# Patient Record
Sex: MALE | Race: WHITE | Employment: OTHER | URBAN - METROPOLITAN AREA
[De-identification: names, ages, dates, MRNs, and addresses within clinical notes are randomized per-mention and may not be internally consistent; named-entity substitution may affect disease eponyms.]

---

## 2017-05-04 ENCOUNTER — OFFICE VISIT (OUTPATIENT)
Dept: UROLOGY | Age: 82
End: 2017-05-04

## 2017-05-04 VITALS
HEIGHT: 68 IN | WEIGHT: 169 LBS | OXYGEN SATURATION: 98 % | DIASTOLIC BLOOD PRESSURE: 64 MMHG | SYSTOLIC BLOOD PRESSURE: 141 MMHG | TEMPERATURE: 97 F | BODY MASS INDEX: 25.61 KG/M2 | HEART RATE: 63 BPM

## 2017-05-04 DIAGNOSIS — R32 URINARY INCONTINENCE, UNSPECIFIED TYPE: ICD-10-CM

## 2017-05-04 DIAGNOSIS — R33.8 BPH (BENIGN PROSTATIC HYPERTROPHY) WITH URINARY RETENTION: Primary | ICD-10-CM

## 2017-05-04 DIAGNOSIS — N40.1 BPH (BENIGN PROSTATIC HYPERTROPHY) WITH URINARY RETENTION: Primary | ICD-10-CM

## 2017-05-04 DIAGNOSIS — R35.0 FREQUENCY OF URINATION: ICD-10-CM

## 2017-05-04 LAB
BILIRUB UR QL STRIP: NEGATIVE
GLUCOSE UR-MCNC: NEGATIVE MG/DL
KETONES P FAST UR STRIP-MCNC: NEGATIVE MG/DL
PH UR STRIP: 6 [PH] (ref 4.6–8)
PROT UR QL STRIP: NEGATIVE MG/DL
SP GR UR STRIP: 1.01 (ref 1–1.03)
UA UROBILINOGEN AMB POC: NORMAL (ref 0.2–1)
URINALYSIS CLARITY POC: CLEAR
URINALYSIS COLOR POC: YELLOW
URINE BLOOD POC: NEGATIVE
URINE LEUKOCYTES POC: NEGATIVE
URINE NITRITES POC: NEGATIVE

## 2017-05-04 RX ORDER — TAMSULOSIN HYDROCHLORIDE 0.4 MG/1
0.4 CAPSULE ORAL DAILY
COMMUNITY
End: 2017-06-19 | Stop reason: SDUPTHER

## 2017-05-04 RX ORDER — FLUTICASONE PROPIONATE 50 MCG
1 SPRAY, SUSPENSION (ML) NASAL DAILY
COMMUNITY
Start: 2015-04-30 | End: 2018-02-19 | Stop reason: ALTCHOICE

## 2017-05-04 RX ORDER — CHOLECALCIFEROL (VITAMIN D3) 125 MCG
CAPSULE ORAL
COMMUNITY

## 2017-05-04 RX ORDER — OXYBUTYNIN CHLORIDE 5 MG/1
5 TABLET ORAL DAILY
COMMUNITY
End: 2017-08-11 | Stop reason: ALTCHOICE

## 2017-05-04 RX ORDER — SIMVASTATIN 20 MG/1
TABLET, FILM COATED ORAL
COMMUNITY
End: 2021-01-01

## 2017-05-04 NOTE — PATIENT INSTRUCTIONS
Frequent Urination: Care Instructions  Your Care Instructions  An urge to urinate frequently but usually passing only small amounts of urine is a common symptom of urinary problems, such as urinary tract infections. The bladder may become inflamed. This can cause the urge to urinate. You may try to urinate more often than usual to try to soothe that urge. Frequent urination also may be caused by sexually transmitted infections (STIs) or kidney stones. Or it may happen when something irritates the tube that carries urine from the bladder to the outside of the body (urethra). It may also be a sign of diabetes. The cause may be hard to find. You may need tests. Follow-up care is a key part of your treatment and safety. Be sure to make and go to all appointments, and call your doctor if you are having problems. It's also a good idea to know your test results and keep a list of the medicines you take. How can you care for yourself at home? · Drink extra water for the next day or two. This will help make the urine less concentrated. (If you have kidney, heart, or liver disease and have to limit fluids, talk with your doctor before you increase the amount of fluids you drink.)  · Avoid drinks that are carbonated or have caffeine. They can irritate the bladder. For women:  · Urinate right after you have sex. · After you go to the bathroom, wipe from front to back. · Avoid douches, bubble baths, and feminine hygiene sprays. And avoid other feminine hygiene products that have deodorants. When should you call for help? Call your doctor now or seek immediate medical care if:  · You have new symptoms, such as fever, nausea, or vomiting. · You have new or worse symptoms of a urinary problem. For example:  ¨ You have blood or pus in your urine. ¨ You have chills or body aches. ¨ It hurts to urinate. ¨ You have groin or belly pain. ¨ You have pain in your back just below your rib cage (the flank area).   Watch closely for changes in your health, and be sure to contact your doctor if you feel thirstier than usual.  Where can you learn more? Go to http://tu-angela.info/. Enter 687 1908 in the search box to learn more about \"Frequent Urination: Care Instructions. \"  Current as of: November 28, 2016  Content Version: 11.2  © 9038-4142 [employer]. Care instructions adapted under license by [employer] (which disclaims liability or warranty for this information). If you have questions about a medical condition or this instruction, always ask your healthcare professional. [place] [address] any warranty or liability for your use of this information.

## 2017-05-04 NOTE — PROGRESS NOTES
Polo Styles 80 y.o. male     Mr. Abdirahman Thayer seen today for evaluation of irritative voiding symptoms-patient complains of nocturia 5-6 times per night associated with severe and sudden urgency episodes during the daytime during the past several months-patient has been treated with alpha-blocker and anticholinergic medication for relief of irritable bladder symptoms caused by BPH under the care of a urologist in [place] but managed by primary physician in [place] for the past 5 years with Ditropan and Flomax    Urinary stream is weak and intermittent-no dysuria-no fever flank pain or pain in the perineum-  No history of  tract disease, trauma, or surgery    Review of Systems:   CNS: No seizures syncope headaches dizziness or visual changes  Respiratory: No wheezing no shortness of breath no coughing-  Cardiovascular: No chest pains no palpitations  Intestinal: No dyspepsia diarrhea or constipation  Urinary: Irritable bladder symptoms for several years  Skeletal: Large joint arthritis  Endocrine: No diabetes or thyroid disease other:    Allergies: Allergies   Allergen Reactions    Lyrica [Pregabalin] Swelling    Clindamycin Other (comments)     C. Diff      Medications:    Current Outpatient Prescriptions   Medication Sig Dispense Refill    oxybutynin (DITROPAN) 5 mg tablet Take 5 mg by mouth three (3) times daily.  tamsulosin (FLOMAX) 0.4 mg capsule Take 0.4 mg by mouth daily.  simvastatin (ZOCOR) 20 mg tablet Take  by mouth nightly.  fluticasone (FLONASE) 50 mcg/actuation nasal spray 1 Spray.  cholecalciferol, vitamin D3, 2,000 unit tab Take  by Mouth.  OMEGA-3 FATTY ACIDS-FISH OIL PO 1,000 mg. History reviewed. No pertinent past medical history.    Past Surgical History:   Procedure Laterality Date    HX CATARACT REMOVAL Right      Family History   Problem Relation Age of Onset    Diabetes Father     Diabetes Brother       Physical Examination: Well-nourished elderly male-appears to be several decades younger than indicated age    Abdomen is nontender no palpable masses no organomegaly  Back-no percussion CVA tenderness on either side  No inguinal hernia or adenopathy  Penis is normal-uncircumcised  Testes are mildly atrophic in size bilaterally-no epididymal induration or tenderness on either side  Spermatic cords are palpably normal bilaterally  Scrotum is normal  Prostate by MARISEL is markedly enlarged rounded, smooth, benign in consistency and nontender-no induration no nodularity  No rectal masses tenderness or induration    Urinalysis: Negative dipstick/nitrite negative    PVR today 571 cc    Ultrasound imaging of the kidneys reveals moderate left-sided hydronephrosis    Impression: Symptomatic BPH with jt urinary retention                      Left-sided hydronephrosis secondary due to bladder outlet obstruction from BPH                         Plan: PSA, creatinine today            Discontinue Ditropan            Continue Flomax 0.4 mg daily    TURP is indicated and recommended    rtc 1 week for counseling and prep for TURP  We will discuss with primary physician/needs clearance for general anesthesia      Case discussed with patient's personal physician,Dr. Villa Alfonso,  by telephone today-  patient cleared for TURP      Brennan Menchaca MD  -electronically signed-    PLEASE NOTE:  This document has been produced using voice recognition software. Unrecognized errors in transcription may be present.

## 2017-05-04 NOTE — MR AVS SNAPSHOT
Visit Information Date & Time Provider Department Dept. Phone Encounter #  
 5/4/2017 11:00 AM [address]  [facility] [phone] [id number] Your Appointments 5/15/2017 11:00 AM  
PRE OP with Russel Pradhan MD  
[facility]) Appt Note: pre op for TURP  
 [address] [place] [address] [address]  
[phone] [address] [facility] [address] Upcoming Health Maintenance Date Due DTaP/Tdap/Td series (1 - Tdap) 1/20/1947 ZOSTER VACCINE AGE 60> 1/20/1986 GLAUCOMA SCREENING Q2Y 1/20/1991 Pneumococcal 65+ Low/Medium Risk (1 of 2 - PCV13) 1/20/1991 MEDICARE YEARLY EXAM 1/20/1991 INFLUENZA AGE 9 TO ADULT 8/1/2017 Allergies as of 5/4/2017  Review Complete On: 5/4/2017 By: Parish Orantes LPN Severity Noted Reaction Type Reactions Lyrica [Pregabalin] High 08/26/2014    Swelling Clindamycin Medium 08/26/2014    Other (comments) C. Diff Current Immunizations  Never Reviewed No immunizations on file. Not reviewed this visit You Were Diagnosed With   
  
 Codes Comments Frequency of urination    -  Primary ICD-10-CM: R35.0 ICD-9-CM: 788.41 Urinary incontinence, unspecified type     ICD-10-CM: R32 
ICD-9-CM: 788.30 BPH (benign prostatic hypertrophy) with urinary retention     ICD-10-CM: N40.1, R33.8 ICD-9-CM: 600.01, 788.20 Vitals BP Pulse Temp Height(growth percentile) Weight(growth percentile) SpO2  
 141/64 (BP 1 Location: Left arm, BP Patient Position: Sitting) 63 97 °F (36.1 °C) 5' 8\" (1.727 m) 169 lb (76.7 kg) 98% BMI Smoking Status 25.7 kg/m2 Former Smoker Vitals History BMI and BSA Data Body Mass Index Body Surface Area 25.7 kg/m 2 1.92 m 2 Preferred Pharm[facility] Pharm[facility] Name Phone  [facility] [address] [phone] Your Updated Medication List  
  
   
This list is accurate as of: 5/4/17 12:43 PM.  Always use your most recent med list.  
  
  
  
  
 cholecalciferol (vitamin D3) 2,000 unit Tab Take  by Mouth. fluticasone 50 mcg/actuation nasal spray Commonly known as:  FLONASE  
1 [place]. OMEGA-3 FATTY ACIDS-FISH OIL PO  
1,000 mg.  
  
 oxybutynin 5 mg tablet Commonly known as:  GNNVCLBQ Take 5 mg by mouth three (3) times daily. simvastatin 20 mg tablet Commonly known as:  ZOCOR Take  by mouth nightly. tamsulosin 0.4 mg capsule Commonly known as:  FLOMAX Take 0.4 mg by mouth daily. We Performed the Following AMB POC URINALYSIS DIP STICK AUTO W/O MICRO [51242 CPT(R)] CREATININE [id number] CPT(R)] CA COLLECTION VENOUS BLOOD,VENIPUNCTURE [id number] CPT(R)] PROSTATE SPECIFIC AG (PSA) [id number] CPT(R)] Patient Instructions Frequent Urination: Care Instructions Your Care Instructions An urge to urinate frequently but usually passing only small amounts of urine is a common symptom of urinary problems, such as urinary tract infections. The bladder may become inflamed. This can cause the urge to urinate. You may try to urinate more often than usual to try to soothe that urge. Frequent urination also may be caused by sexually transmitted infections (STIs) or kidney stones. Or it may happen when something irritates the tube that carries urine from the bladder to the outside of the body (urethra). It may also be a sign of diabetes. The cause may be hard to find. You may need tests. Follow-up care is a key part of your treatment and safety. Be sure to make and go to all appointments, and call your doctor if you are having problems. It's also a good idea to know your test results and keep a list of the medicines you take. How can you care for yourself at home? · Drink extra water for the next day or two.  This will help make the urine less concentrated. (If you have kidney, heart, or liver disease and have to limit fluids, talk with your doctor before you increase the amount of fluids you drink.) · Avoid drinks that are carbonated or have caffeine. They can irritate the bladder. For women: · Urinate right after you have sex. · After you go to the bathroom, wipe from front to back. · Avoid douches, bubble baths, and feminine hygiene sprays. And avoid other feminine hygiene products that have deodorants. When should you call for help? Call your doctor now or seek immediate medical care if: 
· You have new symptoms, such as fever, nausea, or vomiting. · You have new or worse symptoms of a urinary problem. For example: ¨ You have blood or pus in your urine. ¨ You have chills or body aches. ¨ It hurts to urinate. ¨ You have groin or belly pain. ¨ You have pain in your back just below your rib cage (the flank area). Watch closely for changes in your health, and be sure to contact your doctor if you feel thirstier than usual. 
Where can you learn more? Go to http://tu-angela.info/. Enter 486 9301 in the search box to learn more about \"Frequent Urination: Care Instructions. \" Current as of: November 28, 2016 Content Version: 11.2 © 0904-0257 [employer]. Care instructions adapted under license by [employer] (which disclaims liability or warranty for this information). If you have questions about a medical condition or this instruction, always ask your healthcare professional. [place] [address] any warranty or liability for your use of this information. Introducing [facility]! [employer] introduces Bantr patient portal. Now you can access parts of your medical record, email your doctor's office, and request medication refills online. 1. In your internet browser, go to [email or web address] 2. Click on the First Time User?  Click Here link in the Sign In box. You will see the New Member Sign Up page. 3. Enter your KelBillet Access Code exactly as it appears below. You will not need to use this code after youve completed the sign-up process. If you do not sign up before the expiration date, you must request a new code. · KelBillet Access Code: [email or web address] Expires: 8/2/2017 11:10 AM 
 
4. Enter the last four digits of your Social Security Number (xxxx) and Date of Birth (mm/dd/yyyy) as indicated and click Submit. You will be taken to the next sign-up page. 5. Create a KelBillet ID. This will be your KelBillet login ID and cannot be changed, so think of one that is secure and easy to remember. 6. Create a KelBillet password. You can change your password at any time. 7. Enter your Password Reset Question and Answer. This can be used at a later time if you forget your password. 8. Enter your e-mail address. You will receive e-mail notification when new information is available in [address] 9. Click Sign Up. You can now view and download portions of your medical record. 10. Click the Download Summary menu link to download a portable copy of your medical information. If you have questions, please visit the Frequently Asked Questions section of the KelBillet website. Remember, KelBillet is NOT to be used for urgent needs. For medical emergencies, dial 911. Now available from your iPhone and Android! Please provide this summary of care documentation to your next provider. Your primary care clinician is listed as ELFEGO NAPOLES. If you have any questions after today's visit, please call [phone].

## 2017-05-04 NOTE — PROGRESS NOTES
RBV per Dr. Emerita Bella blood drawn in office today for PSA and Creatinine for BPH with Retention.

## 2017-05-04 NOTE — PROGRESS NOTES
Mr. Sergio Lo has a reminder for a \"due or due soon\" health maintenance. I have asked that he contact his primary care provider for follow-up on this health maintenance.

## 2017-05-05 LAB
CREAT SERPL-MCNC: 0.82 MG/DL (ref 0.76–1.27)
PSA SERPL-MCNC: 10 NG/ML (ref 0–4)

## 2017-05-12 ENCOUNTER — HOSPITAL ENCOUNTER (OUTPATIENT)
Dept: PREADMISSION TESTING | Age: 82
Discharge: HOME | End: 2017-05-12
Payer: MEDICARE

## 2017-05-12 DIAGNOSIS — Z01.818 PRE-OP TESTING: ICD-10-CM

## 2017-05-12 LAB
ANION GAP BLD CALC-SCNC: 5 MMOL/L (ref 3–18)
ATRIAL RATE: 53 BPM
BUN SERPL-MCNC: 18 MG/DL (ref 7–18)
BUN/CREAT SERPL: 21 (ref 12–20)
CALCIUM SERPL-MCNC: 9.2 MG/DL (ref 8.5–10.1)
CALCULATED P AXIS, ECG09: 48 DEGREES
CALCULATED R AXIS, ECG10: 44 DEGREES
CALCULATED T AXIS, ECG11: 71 DEGREES
CHLORIDE SERPL-SCNC: 104 MMOL/L (ref 100–108)
CO2 SERPL-SCNC: 30 MMOL/L (ref 21–32)
CREAT SERPL-MCNC: 0.84 MG/DL (ref 0.6–1.3)
DIAGNOSIS, 93000: NORMAL
ERYTHROCYTE [DISTWIDTH] IN BLOOD BY AUTOMATED COUNT: 14.1 % (ref 11.6–14.5)
GLUCOSE SERPL-MCNC: 98 MG/DL (ref 74–99)
HCT VFR BLD AUTO: 39 % (ref 36–48)
HGB BLD-MCNC: 12.8 G/DL (ref 13–16)
MCH RBC QN AUTO: 28.4 PG (ref 24–34)
MCHC RBC AUTO-ENTMCNC: 32.8 G/DL (ref 31–37)
MCV RBC AUTO: 86.7 FL (ref 74–97)
P-R INTERVAL, ECG05: 176 MS
PLATELET # BLD AUTO: 142 K/UL (ref 135–420)
PMV BLD AUTO: 11.4 FL (ref 9.2–11.8)
POTASSIUM SERPL-SCNC: 4.4 MMOL/L (ref 3.5–5.5)
Q-T INTERVAL, ECG07: 412 MS
QRS DURATION, ECG06: 78 MS
QTC CALCULATION (BEZET), ECG08: 386 MS
RBC # BLD AUTO: 4.5 M/UL (ref 4.7–5.5)
SODIUM SERPL-SCNC: 139 MMOL/L (ref 136–145)
VENTRICULAR RATE, ECG03: 53 BPM
WBC # BLD AUTO: 6.5 K/UL (ref 4.6–13.2)

## 2017-05-12 PROCEDURE — 36415 COLL VENOUS BLD VENIPUNCTURE: CPT | Performed by: UROLOGY

## 2017-05-12 PROCEDURE — 85027 COMPLETE CBC AUTOMATED: CPT | Performed by: UROLOGY

## 2017-05-12 PROCEDURE — 93005 ELECTROCARDIOGRAM TRACING: CPT

## 2017-05-12 PROCEDURE — 80048 BASIC METABOLIC PNL TOTAL CA: CPT | Performed by: UROLOGY

## 2017-05-15 ENCOUNTER — OFFICE VISIT (OUTPATIENT)
Dept: UROLOGY | Age: 82
End: 2017-05-15

## 2017-05-15 VITALS
WEIGHT: 167 LBS | HEART RATE: 69 BPM | DIASTOLIC BLOOD PRESSURE: 53 MMHG | SYSTOLIC BLOOD PRESSURE: 158 MMHG | HEIGHT: 68 IN | OXYGEN SATURATION: 98 % | TEMPERATURE: 97.3 F | BODY MASS INDEX: 25.31 KG/M2

## 2017-05-15 DIAGNOSIS — Z01.818 PRE-OP TESTING: ICD-10-CM

## 2017-05-15 DIAGNOSIS — N40.1 BPH (BENIGN PROSTATIC HYPERTROPHY) WITH URINARY RETENTION: Primary | ICD-10-CM

## 2017-05-15 DIAGNOSIS — R97.20 ELEVATED PSA: ICD-10-CM

## 2017-05-15 DIAGNOSIS — R33.8 BPH (BENIGN PROSTATIC HYPERTROPHY) WITH URINARY RETENTION: Primary | ICD-10-CM

## 2017-05-15 LAB
BILIRUB UR QL STRIP: NEGATIVE
GLUCOSE UR-MCNC: NEGATIVE MG/DL
KETONES P FAST UR STRIP-MCNC: NEGATIVE MG/DL
PH UR STRIP: 5.5 [PH] (ref 4.6–8)
PROT UR QL STRIP: NEGATIVE MG/DL
SP GR UR STRIP: 1 (ref 1–1.03)
UA UROBILINOGEN AMB POC: NORMAL (ref 0.2–1)
URINALYSIS CLARITY POC: CLEAR
URINALYSIS COLOR POC: YELLOW
URINE BLOOD POC: NEGATIVE
URINE LEUKOCYTES POC: NEGATIVE
URINE NITRITES POC: NEGATIVE

## 2017-05-15 RX ORDER — SODIUM CHLORIDE 9 MG/ML
100 INJECTION, SOLUTION INTRAVENOUS CONTINUOUS
Status: CANCELLED | OUTPATIENT
Start: 2017-05-15 | End: 2017-05-16

## 2017-05-15 NOTE — MR AVS SNAPSHOT
Visit Information Date & Time Provider Department Dept. Phone Encounter #  
 5/15/2017 11:00 AM Brennan Nikolai, [address] [id number] Your Appointments 5/22/2017  9:15 AM  
POST OP with Brennan Menchaca MD  
[facility]) Appt Note: po turp [address] [place] [address] [address]  
[phone] [address] [facility] [address] Upcoming Health Maintenance Date Due DTaP/Tdap/Td series (1 - Tdap) 1[birth year]20/1947 ZOSTER VACCINE AGE 60> 1/20/1986 GLAUCOMA SCREENING Q2Y 1/20/1991 Pneumococcal 65+ Low/Medium Risk (1 of 2 - PCV13) 1/20/1991 MEDICARE YEARLY EXAM 1/20/1991 INFLUENZA AGE 9 TO ADULT 8/1/2017 Allergies as of 5/15/2017  Review Complete On: 5/15/2017 By: Faye Forte LPN Severity Noted Reaction Type Reactions Lyrica [Pregabalin] High 08/26/2014    Swelling Clindamycin Medium 08/26/2014    Other (comments) C. Diff Current Immunizations  Never Reviewed No immunizations on file. Not reviewed this visit You Were Diagnosed With   
  
 Codes Comments BPH (benign prostatic hypertrophy) with urinary retention    -  Primary ICD-10-CM: N40.1, R33.8 ICD-9-CM: 600.01, 788.20 Pre-op testing     ICD-10-CM: U41.264 ICD-9-CM: V72.84 Vitals BP Pulse Temp Height(growth percentile) Weight(growth percentile) SpO2  
 158/53 (BP 1 Location: Left arm, BP Patient Position: Sitting) 69 97.3 °F (36.3 °C) 5' 8\" (1.727 m) 167 lb (75.8 kg) 98% BMI Smoking Status 25.39 kg/m2 Former Smoker Vitals History BMI and BSA Data Body Mass Index Body Surface Area  
 25.39 kg/m 2 1.91 m 2 Preferred Pharmacy Pharmacy Name Phone [facility] [address] [place], [address] Your Updated Medication List  
  
   
This list is accurate as of: 5/15/17 11:50 AM.  Always use your most recent med list.  
  
  
  
  
 cholecalciferol (vitamin D3) 2,000 unit Tab Take  by Mouth. fluticasone 50 mcg/actuation nasal spray Commonly known as:  FLONASE  
1 [place]. OMEGA-3 FATTY ACIDS-FISH OIL PO  
1,000 mg.  
  
 oxybutynin 5 mg tablet Commonly known as:  SLUXRMXZ Take 5 mg by mouth three (3) times daily. simvastatin 20 mg tablet Commonly known as:  ZOCOR Take  by mouth nightly. tamsulosin 0.4 mg capsule Commonly known as:  FLOMAX Take 0.4 mg by mouth daily. We Performed the Following AMB POC URINALYSIS DIP STICK AUTO W/O MICRO [46940 CPT(R)] Patient Instructions Transurethral Resection of the Prostate (TURP): Before Your Surgery What is transurethral resection of the prostate? Transurethral resection of the prostate (TURP) is surgery to reduce or remove prostate tissue. It is done when an overgrown prostate gland is pressing on the urethra and making it hard for a man to urinate. The prostate gland is a small organ just below a man's bladder. It makes most of the fluid in semen. The urethra is the tube that carries urine from the bladder out of the body through the penis. It passes through the prostate. When the prostate gets too large, it can press on the urethra. Your doctor will give you medicine to make you sleep or feel relaxed. You will be kept comfortable. If you are awake during the surgery, you will get medicine to numb you from the chest down. The doctor puts a thin, lighted tube into your urethra. This is called a scope. It goes in through the opening in your penis. Then the doctor puts small surgical tools through the scope. These tools are used to remove the part of the prostate that is blocking urine flow. When the doctor is finished, he or she takes out the scope. This surgery may make it easier for you to urinate.  You may have better control when you start and stop your urine stream. And you may feel like you get more relief when you urinate. Most men go home from the hospital 1 or 2 days after surgery. You may be able to go back to work or most of your usual routine in 1 to 3 weeks. But for about 6 weeks, you will need to avoid heavy lifting and activities that might put extra pressure on your bladder. Follow-up care is a key part of your treatment and safety. Be sure to make and go to all appointments, and call your doctor if you are having problems. It's also a good idea to know your test results and keep a list of the medicines you take. What happens before surgery? Surgery can be stressful. This information will help you understand what you can expect. And it will help you safely prepare for your surgery. Preparing for surgery · Understand exactly what surgery is planned, along with the risks, benefits, and other options. · If you plan to father a child, talk to your doctor about saving your sperm before the surgery. After TURP, there is a chance that your semen may go into your bladder instead of out through your penis. · Tell your doctors ALL the medicines, vitamins, supplements, and herbal remedies you take. Some of these can increase the risk of bleeding or interact with anesthesia. · If you take aspirin or some other blood thinner, be sure to talk to your doctor. He or she will tell you if you should stop taking it before your surgery. Make sure that you understand exactly what your doctor wants you to do. · Your doctor will tell you which medicines to take or stop before your surgery. You may need to stop taking certain medicines a week or more before surgery. So talk to your doctor as soon as you can. · If you have an advance directive, let your doctor know. It may include a living will and a durable power of  for health care. Bring a copy to the hospital. If you don't have one, you may want to prepare one.  It lets your doctor and loved ones know your health care wishes. Doctors advise that everyone prepare these papers before any type of surgery or procedure. · You may need to empty your bowels with an enema or laxative. Your doctor will tell you how to do this. What happens on the day of surgery? · Follow the instructions exactly about when to stop eating and drinking. If you don't, your surgery may be canceled. If your doctor told you to take your medicines on the day of surgery, take them with only a sip of water. · Take a bath or shower before you come in for your surgery. Do not apply lotions, perfumes, deodorants, or nail polish. · Do not shave the surgical site yourself. · Take off all jewelry and piercings. And take out contact lenses, if you wear them. At the hospital or surgery center · Bring a picture ID. · The area for surgery is often marked to make sure there are no errors. · You will be kept comfortable and safe by your anesthesia provider. The anesthesia may make you sleep. Or it may just numb the area being worked on. · The surgery will take 1 to 2 hours. Going home · Be sure you have someone to drive you home. Anesthesia and pain medicine make it unsafe for you to drive. · You will be given more specific instructions about recovering from your surgery. They will cover things like diet, wound care, follow-up care, driving, and getting back to your normal routine. · You may go home with a urinary catheter in place. It is a flexible plastic tube used to drain urine from your bladder when you can't urinate on your own. Your doctor will give you instructions about how to care for your catheter and when it will be removed. When should you call your doctor? · You have questions or concerns. · You don't understand how to prepare for your surgery. · You become ill before the surgery (such as fever, flu, or a cold). · You need to reschedule or have changed your mind about having the surgery. Where can you learn more?  
Go to http://tu-angela.info/. Enter G648 in the search box to learn more about \"Transurethral Resection of the Prostate (TURP): Before Your Surgery. \" Current as of: October 31, 2016 Content Version: 11.2 © 5687-7596 [employer]. Care instructions adapted under license by [employer] (which disclaims liability or warranty for this information). If you have questions about a medical condition or this instruction, always ask your healthcare professional. [address] any warranty or liability for your use of this information. Please provide this summary of care documentation to your next provider. Your primary care clinician is listed as ELFEGO NAPOLES. If you have any questions after today's visit, please call [phone].

## 2017-05-15 NOTE — PROGRESS NOTES
MrMason Collins has a reminder for a \"due or due soon\" health maintenance. I have asked that he contact his primary care provider for follow-up on this health maintenance.

## 2017-05-15 NOTE — PATIENT INSTRUCTIONS
Transurethral Resection of the Prostate (TURP): Before Your Surgery  What is transurethral resection of the prostate? Transurethral resection of the prostate (TURP) is surgery to reduce or remove prostate tissue. It is done when an overgrown prostate gland is pressing on the urethra and making it hard for a man to urinate. The prostate gland is a small organ just below a man's bladder. It makes most of the fluid in semen. The urethra is the tube that carries urine from the bladder out of the body through the penis. It passes through the prostate. When the prostate gets too large, it can press on the urethra. Your doctor will give you medicine to make you sleep or feel relaxed. You will be kept comfortable. If you are awake during the surgery, you will get medicine to numb you from the chest down. The doctor puts a thin, lighted tube into your urethra. This is called a scope. It goes in through the opening in your penis. Then the doctor puts small surgical tools through the scope. These tools are used to remove the part of the prostate that is blocking urine flow. When the doctor is finished, he or she takes out the scope. This surgery may make it easier for you to urinate. You may have better control when you start and stop your urine stream. And you may feel like you get more relief when you urinate. Most men go home from the hospital 1 or 2 days after surgery. You may be able to go back to work or most of your usual routine in 1 to 3 weeks. But for about 6 weeks, you will need to avoid heavy lifting and activities that might put extra pressure on your bladder. Follow-up care is a key part of your treatment and safety. Be sure to make and go to all appointments, and call your doctor if you are having problems. It's also a good idea to know your test results and keep a list of the medicines you take. What happens before surgery? Surgery can be stressful.  This information will help you understand what you can expect. And it will help you safely prepare for your surgery. Preparing for surgery  · Understand exactly what surgery is planned, along with the risks, benefits, and other options. · If you plan to father a child, talk to your doctor about saving your sperm before the surgery. After TURP, there is a chance that your semen may go into your bladder instead of out through your penis. · Tell your doctors ALL the medicines, vitamins, supplements, and herbal remedies you take. Some of these can increase the risk of bleeding or interact with anesthesia. · If you take aspirin or some other blood thinner, be sure to talk to your doctor. He or she will tell you if you should stop taking it before your surgery. Make sure that you understand exactly what your doctor wants you to do. · Your doctor will tell you which medicines to take or stop before your surgery. You may need to stop taking certain medicines a week or more before surgery. So talk to your doctor as soon as you can. · If you have an advance directive, let your doctor know. It may include a living will and a durable power of  for health care. Bring a copy to the hospital. If you don't have one, you may want to prepare one. It lets your doctor and loved ones know your health care wishes. Doctors advise that everyone prepare these papers before any type of surgery or procedure. · You may need to empty your bowels with an enema or laxative. Your doctor will tell you how to do this. What happens on the day of surgery? · Follow the instructions exactly about when to stop eating and drinking. If you don't, your surgery may be canceled. If your doctor told you to take your medicines on the day of surgery, take them with only a sip of water. · Take a bath or shower before you come in for your surgery. Do not apply lotions, perfumes, deodorants, or nail polish. · Do not shave the surgical site yourself. · Take off all jewelry and piercings.  And take out contact lenses, if you wear them. At the hospital or surgery center  · Bring a picture ID. · The area for surgery is often marked to make sure there are no errors. · You will be kept comfortable and safe by your anesthesia provider. The anesthesia may make you sleep. Or it may just numb the area being worked on. · The surgery will take 1 to 2 hours. Going home  · Be sure you have someone to drive you home. Anesthesia and pain medicine make it unsafe for you to drive. · You will be given more specific instructions about recovering from your surgery. They will cover things like diet, wound care, follow-up care, driving, and getting back to your normal routine. · You may go home with a urinary catheter in place. It is a flexible plastic tube used to drain urine from your bladder when you can't urinate on your own. Your doctor will give you instructions about how to care for your catheter and when it will be removed. When should you call your doctor? · You have questions or concerns. · You don't understand how to prepare for your surgery. · You become ill before the surgery (such as fever, flu, or a cold). · You need to reschedule or have changed your mind about having the surgery. Where can you learn more? Go to http://tu-angela.info/. Enter D980 in the search box to learn more about \"Transurethral Resection of the Prostate (TURP): Before Your Surgery. \"  Current as of: October 31, 2016  Content Version: 11.2  © 2305-3849 Healthwise, [facility]. Care instructions adapted under license by [employer] (which disclaims liability or warranty for this information). If you have questions about a medical condition or this instruction, always ask your healthcare professional. [place] [address] any warranty or liability for your use of this information.

## 2017-05-16 NOTE — PROGRESS NOTES
Lena Ramirez 80 y.o. male     Mr. Esposito Agent seen today for preop assessment and counseling for TURP     patient complains of irritative voiding symptoms-patient complains of nocturia 5-6 times per night associated with severe and sudden urgency episodes during the daytime during the past several months-patient has been treated with alpha-blocker and anticholinergic medication for relief of irritable bladder symptoms caused by BPH under the care of a urologist in [place] but managed by primary physician in [place] for the past 5 years with Ditropan and Flomax     Urinary stream is weak and intermittent-no dysuria-no fever flank pain or pain in the perineum-  No history of  tract disease, trauma, or surgery    PVR 5 71 cc on for May 2017    PSA 10.0 on 4 May 2017       Review of Systems:   CNS: No seizures syncope headaches dizziness or visual changes  Respiratory: No wheezing no shortness of breath no coughing-  Cardiovascular: No chest pains no palpitations  Intestinal: No dyspepsia diarrhea or constipation  Urinary: Irritable bladder symptoms for several years  Skeletal: Large joint arthritis  Endocrine: No diabetes or thyroid disease other:     Allergies: Allergies   Allergen Reactions    Lyrica [Pregabalin] Swelling    Clindamycin Other (comments)     C. Diff      Medications:    Current Outpatient Prescriptions   Medication Sig Dispense Refill    oxybutynin (DITROPAN) 5 mg tablet Take 5 mg by mouth three (3) times daily.  tamsulosin (FLOMAX) 0.4 mg capsule Take 0.4 mg by mouth daily.  simvastatin (ZOCOR) 20 mg tablet Take  by mouth nightly.  cholecalciferol, vitamin D3, 2,000 unit tab Take  by Mouth.  OMEGA-3 FATTY ACIDS-FISH OIL PO 1,000 mg.      fluticasone (FLONASE) 50 mcg/actuation nasal spray 1 [place].          Past Medical History:   Diagnosis Date    BPH (benign prostatic hyperplasia)       Past Surgical History:   Procedure Laterality Date    HX CATARACT REMOVAL Bilateral 12/2016    dec/2016-left eye, jan/2017-right eye    HX CYST REMOVAL  1947    HX MOHS PROCEDURES  1980s    left ear     Family History   Problem Relation Age of Onset    Diabetes Father     Diabetes Brother         Physical Examination: Well-nourished mature male in no apparent distress  Neck: No masses nodes or bruits   lungs: Clear breath sounds bilaterally no wheezes rales or rhonchi  Heart: Regular sinus rhythm no murmurs gallops or rubs   Abdomen: Tender no palpable masses no organomegaly no bruits  Back: No percussion CVA tenderness on either side  Skin: Warm and dry no rash no lesions  Neurologic: No motor or sensory deficits in arms or legs   Genitalia: Normal penis, testes bilaterally, enlarged rounded smooth benign consistency prostate  No rectal masses induration or tenderness    Urinalysis: Negative dipstick/nitrite negative    Impression: Symptomatic BPH progression to jt urinary retention/failed alpha-blocker therapy                       -Elevated PSA    Plan: TURP/transperineal prostate biopsy-plan overnight observation following TURP because of patient's advanced age    Counseling Note:  Indications for it technique a TURP have been described discussed utilizing Dr. Jt Noble understands and accepts the risks of bleeding, infection, transfusions, postoperative urinary incontinence as well as retrograde ejaculation-persistence of obstruction and persistent irritative voiding symptoms are possible possibly this procedure    rtc 1 week postop    More than 1/2 of this 25 minute visit was spent in counselling and coordination of care, as described above. Kristi Garcia MD    -electronically signed-    PLEASE NOTE:  This document has been produced using voice recognition software. Unrecognized errors in transcription may be present.

## 2017-05-18 ENCOUNTER — ANESTHESIA EVENT (OUTPATIENT)
Dept: SURGERY | Age: 82
End: 2017-05-18
Payer: MEDICARE

## 2017-05-19 ENCOUNTER — ANESTHESIA (OUTPATIENT)
Dept: SURGERY | Age: 82
End: 2017-05-19
Payer: MEDICARE

## 2017-05-19 ENCOUNTER — HOSPITAL ENCOUNTER (OUTPATIENT)
Age: 82
Setting detail: OBSERVATION
Discharge: HOME | End: 2017-05-20
Attending: UROLOGY | Admitting: UROLOGY
Payer: MEDICARE

## 2017-05-19 LAB
ERYTHROCYTE [DISTWIDTH] IN BLOOD BY AUTOMATED COUNT: 13.9 % (ref 11.6–14.5)
HCT VFR BLD AUTO: 37.7 % (ref 36–48)
HGB BLD-MCNC: 12.6 G/DL (ref 13–16)
MCH RBC QN AUTO: 28.8 PG (ref 24–34)
MCHC RBC AUTO-ENTMCNC: 33.4 G/DL (ref 31–37)
MCV RBC AUTO: 86.1 FL (ref 74–97)
PLATELET # BLD AUTO: 130 K/UL (ref 135–420)
PMV BLD AUTO: 11.1 FL (ref 9.2–11.8)
RBC # BLD AUTO: 4.38 M/UL (ref 4.7–5.5)
WBC # BLD AUTO: 7.2 K/UL (ref 4.6–13.2)

## 2017-05-19 PROCEDURE — 99218 HC RM OBSERVATION: CPT

## 2017-05-19 PROCEDURE — 74011250636 HC RX REV CODE- 250/636: Performed by: ANESTHESIOLOGY

## 2017-05-19 PROCEDURE — 74011250636 HC RX REV CODE- 250/636

## 2017-05-19 PROCEDURE — 85027 COMPLETE CBC AUTOMATED: CPT | Performed by: UROLOGY

## 2017-05-19 PROCEDURE — 77030031458 HC ELECTRD TUR BTTN OCOA -F: Performed by: UROLOGY

## 2017-05-19 PROCEDURE — 74011250636 HC RX REV CODE- 250/636: Performed by: UROLOGY

## 2017-05-19 PROCEDURE — 74011000250 HC RX REV CODE- 250: Performed by: ANESTHESIOLOGY

## 2017-05-19 PROCEDURE — 77030018836 HC SOL IRR NACL ICUM -A: Performed by: UROLOGY

## 2017-05-19 PROCEDURE — 77030010545: Performed by: UROLOGY

## 2017-05-19 PROCEDURE — 74011000250 HC RX REV CODE- 250: Performed by: UROLOGY

## 2017-05-19 PROCEDURE — 77030012961 HC IRR KT CYSTO/TUR ICUM -A: Performed by: UROLOGY

## 2017-05-19 PROCEDURE — 74011000250 HC RX REV CODE- 250

## 2017-05-19 PROCEDURE — 88305 TISSUE EXAM BY PATHOLOGIST: CPT | Performed by: UROLOGY

## 2017-05-19 PROCEDURE — 77030020015 HC EVAC BLDR UROVAC BSC -B: Performed by: UROLOGY

## 2017-05-19 PROCEDURE — 77030011942 HC CATH URETH FOL46 BARD -B: Performed by: UROLOGY

## 2017-05-19 PROCEDURE — 77030003439 HC NDL BIOP BARD -B: Performed by: UROLOGY

## 2017-05-19 PROCEDURE — 74011250637 HC RX REV CODE- 250/637: Performed by: UROLOGY

## 2017-05-19 PROCEDURE — 76210000016 HC OR PH I REC 1 TO 1.5 HR: Performed by: UROLOGY

## 2017-05-19 PROCEDURE — 36415 COLL VENOUS BLD VENIPUNCTURE: CPT | Performed by: UROLOGY

## 2017-05-19 PROCEDURE — 77030029290 HC ELECTRD LP CUT OCOA -F: Performed by: UROLOGY

## 2017-05-19 PROCEDURE — 76010000149 HC OR TIME 1 TO 1.5 HR: Performed by: UROLOGY

## 2017-05-19 PROCEDURE — 74011250636 HC RX REV CODE- 250/636: Performed by: NURSE ANESTHETIST, CERTIFIED REGISTERED

## 2017-05-19 PROCEDURE — 76060000033 HC ANESTHESIA 1 TO 1.5 HR: Performed by: UROLOGY

## 2017-05-19 PROCEDURE — 77030020782 HC GWN BAIR PAWS FLX 3M -B: Performed by: UROLOGY

## 2017-05-19 PROCEDURE — 77030005520 HC CATH URETH FOL38 BARD -A: Performed by: UROLOGY

## 2017-05-19 PROCEDURE — 74011000258 HC RX REV CODE- 258: Performed by: UROLOGY

## 2017-05-19 RX ORDER — GLYCOPYRROLATE 0.2 MG/ML
INJECTION INTRAMUSCULAR; INTRAVENOUS AS NEEDED
Status: DISCONTINUED | OUTPATIENT
Start: 2017-05-19 | End: 2017-05-19 | Stop reason: HOSPADM

## 2017-05-19 RX ORDER — FAMOTIDINE 20 MG/1
20 TABLET, FILM COATED ORAL ONCE
Status: DISCONTINUED | OUTPATIENT
Start: 2017-05-19 | End: 2017-05-19 | Stop reason: HOSPADM

## 2017-05-19 RX ORDER — DOCUSATE SODIUM 100 MG/1
100 CAPSULE, LIQUID FILLED ORAL 2 TIMES DAILY
Status: DISCONTINUED | OUTPATIENT
Start: 2017-05-19 | End: 2017-05-20 | Stop reason: HOSPADM

## 2017-05-19 RX ORDER — NALOXONE HYDROCHLORIDE 0.4 MG/ML
0.1 INJECTION, SOLUTION INTRAMUSCULAR; INTRAVENOUS; SUBCUTANEOUS AS NEEDED
Status: DISCONTINUED | OUTPATIENT
Start: 2017-05-19 | End: 2017-05-20 | Stop reason: HOSPADM

## 2017-05-19 RX ORDER — SODIUM CHLORIDE 9 MG/ML
100 INJECTION, SOLUTION INTRAVENOUS CONTINUOUS
Status: DISPENSED | OUTPATIENT
Start: 2017-05-19 | End: 2017-05-20

## 2017-05-19 RX ORDER — LIDOCAINE HYDROCHLORIDE 10 MG/ML
0.1 INJECTION, SOLUTION EPIDURAL; INFILTRATION; INTRACAUDAL; PERINEURAL AS NEEDED
Status: DISCONTINUED | OUTPATIENT
Start: 2017-05-19 | End: 2017-05-19 | Stop reason: HOSPADM

## 2017-05-19 RX ORDER — MORPHINE SULFATE 2 MG/ML
2 INJECTION, SOLUTION INTRAMUSCULAR; INTRAVENOUS
Status: DISCONTINUED | OUTPATIENT
Start: 2017-05-19 | End: 2017-05-20 | Stop reason: HOSPADM

## 2017-05-19 RX ORDER — ATROPA BELLADONNA AND OPIUM 16.2; 3 MG/1; MG/1
1 SUPPOSITORY RECTAL
Status: DISCONTINUED | OUTPATIENT
Start: 2017-05-19 | End: 2017-05-20 | Stop reason: HOSPADM

## 2017-05-19 RX ORDER — HYDROCODONE BITARTRATE AND ACETAMINOPHEN 5; 325 MG/1; MG/1
1 TABLET ORAL
Status: DISCONTINUED | OUTPATIENT
Start: 2017-05-19 | End: 2017-05-20 | Stop reason: HOSPADM

## 2017-05-19 RX ORDER — SODIUM CHLORIDE 9 MG/ML
100 INJECTION, SOLUTION INTRAVENOUS CONTINUOUS
Status: DISCONTINUED | OUTPATIENT
Start: 2017-05-19 | End: 2017-05-19 | Stop reason: HOSPADM

## 2017-05-19 RX ORDER — SODIUM CHLORIDE, SODIUM LACTATE, POTASSIUM CHLORIDE, CALCIUM CHLORIDE 600; 310; 30; 20 MG/100ML; MG/100ML; MG/100ML; MG/100ML
75 INJECTION, SOLUTION INTRAVENOUS CONTINUOUS
Status: DISCONTINUED | OUTPATIENT
Start: 2017-05-19 | End: 2017-05-20 | Stop reason: HOSPADM

## 2017-05-19 RX ORDER — CEFAZOLIN SODIUM 2 G/50ML
2 SOLUTION INTRAVENOUS ONCE
Status: COMPLETED | OUTPATIENT
Start: 2017-05-19 | End: 2017-05-19

## 2017-05-19 RX ORDER — ONDANSETRON 2 MG/ML
4 INJECTION INTRAMUSCULAR; INTRAVENOUS
Status: DISCONTINUED | OUTPATIENT
Start: 2017-05-19 | End: 2017-05-20 | Stop reason: HOSPADM

## 2017-05-19 RX ORDER — SODIUM CHLORIDE 0.9 % (FLUSH) 0.9 %
5-10 SYRINGE (ML) INJECTION AS NEEDED
Status: DISCONTINUED | OUTPATIENT
Start: 2017-05-19 | End: 2017-05-20 | Stop reason: HOSPADM

## 2017-05-19 RX ORDER — SODIUM CHLORIDE, SODIUM LACTATE, POTASSIUM CHLORIDE, CALCIUM CHLORIDE 600; 310; 30; 20 MG/100ML; MG/100ML; MG/100ML; MG/100ML
INJECTION, SOLUTION INTRAVENOUS
Status: DISCONTINUED | OUTPATIENT
Start: 2017-05-19 | End: 2017-05-19 | Stop reason: HOSPADM

## 2017-05-19 RX ORDER — SODIUM CHLORIDE, SODIUM LACTATE, POTASSIUM CHLORIDE, CALCIUM CHLORIDE 600; 310; 30; 20 MG/100ML; MG/100ML; MG/100ML; MG/100ML
75 INJECTION, SOLUTION INTRAVENOUS CONTINUOUS
Status: DISCONTINUED | OUTPATIENT
Start: 2017-05-19 | End: 2017-05-19 | Stop reason: HOSPADM

## 2017-05-19 RX ORDER — SODIUM CHLORIDE 0.9 % (FLUSH) 0.9 %
5-10 SYRINGE (ML) INJECTION EVERY 8 HOURS
Status: DISCONTINUED | OUTPATIENT
Start: 2017-05-19 | End: 2017-05-19 | Stop reason: HOSPADM

## 2017-05-19 RX ORDER — ONDANSETRON 2 MG/ML
INJECTION INTRAMUSCULAR; INTRAVENOUS AS NEEDED
Status: DISCONTINUED | OUTPATIENT
Start: 2017-05-19 | End: 2017-05-19 | Stop reason: HOSPADM

## 2017-05-19 RX ORDER — NALOXONE HYDROCHLORIDE 0.4 MG/ML
0.4 INJECTION, SOLUTION INTRAMUSCULAR; INTRAVENOUS; SUBCUTANEOUS AS NEEDED
Status: DISCONTINUED | OUTPATIENT
Start: 2017-05-19 | End: 2017-05-20 | Stop reason: HOSPADM

## 2017-05-19 RX ORDER — DEXAMETHASONE SODIUM PHOSPHATE 4 MG/ML
INJECTION, SOLUTION INTRA-ARTICULAR; INTRALESIONAL; INTRAMUSCULAR; INTRAVENOUS; SOFT TISSUE AS NEEDED
Status: DISCONTINUED | OUTPATIENT
Start: 2017-05-19 | End: 2017-05-19 | Stop reason: HOSPADM

## 2017-05-19 RX ORDER — ZOLPIDEM TARTRATE 5 MG/1
5 TABLET ORAL
Status: DISCONTINUED | OUTPATIENT
Start: 2017-05-19 | End: 2017-05-20 | Stop reason: HOSPADM

## 2017-05-19 RX ORDER — SODIUM CHLORIDE 0.9 % (FLUSH) 0.9 %
5-10 SYRINGE (ML) INJECTION AS NEEDED
Status: DISCONTINUED | OUTPATIENT
Start: 2017-05-19 | End: 2017-05-19 | Stop reason: HOSPADM

## 2017-05-19 RX ORDER — ACETAMINOPHEN 325 MG/1
650 TABLET ORAL
Status: DISCONTINUED | OUTPATIENT
Start: 2017-05-19 | End: 2017-05-20 | Stop reason: HOSPADM

## 2017-05-19 RX ORDER — PROPOFOL 10 MG/ML
INJECTION, EMULSION INTRAVENOUS AS NEEDED
Status: DISCONTINUED | OUTPATIENT
Start: 2017-05-19 | End: 2017-05-19 | Stop reason: HOSPADM

## 2017-05-19 RX ORDER — NEOMYCIN AND POLYMYXIN B SULFATES 40; 200000 MG/ML; [USP'U]/ML
SOLUTION IRRIGATION AS NEEDED
Status: DISCONTINUED | OUTPATIENT
Start: 2017-05-19 | End: 2017-05-19 | Stop reason: HOSPADM

## 2017-05-19 RX ORDER — FENTANYL CITRATE 50 UG/ML
INJECTION, SOLUTION INTRAMUSCULAR; INTRAVENOUS AS NEEDED
Status: DISCONTINUED | OUTPATIENT
Start: 2017-05-19 | End: 2017-05-19 | Stop reason: HOSPADM

## 2017-05-19 RX ORDER — SODIUM CHLORIDE 0.9 % (FLUSH) 0.9 %
5-10 SYRINGE (ML) INJECTION EVERY 8 HOURS
Status: DISCONTINUED | OUTPATIENT
Start: 2017-05-19 | End: 2017-05-20 | Stop reason: HOSPADM

## 2017-05-19 RX ORDER — MORPHINE SULFATE 2 MG/ML
1 INJECTION, SOLUTION INTRAMUSCULAR; INTRAVENOUS
Status: DISCONTINUED | OUTPATIENT
Start: 2017-05-19 | End: 2017-05-20 | Stop reason: HOSPADM

## 2017-05-19 RX ORDER — LIDOCAINE HYDROCHLORIDE 20 MG/ML
INJECTION, SOLUTION EPIDURAL; INFILTRATION; INTRACAUDAL; PERINEURAL AS NEEDED
Status: DISCONTINUED | OUTPATIENT
Start: 2017-05-19 | End: 2017-05-19 | Stop reason: HOSPADM

## 2017-05-19 RX ADMIN — CEFAZOLIN SODIUM 2 G: 2 SOLUTION INTRAVENOUS at 11:12

## 2017-05-19 RX ADMIN — FENTANYL CITRATE 50 MCG: 50 INJECTION, SOLUTION INTRAMUSCULAR; INTRAVENOUS at 11:15

## 2017-05-19 RX ADMIN — FENTANYL CITRATE 25 MCG: 50 INJECTION, SOLUTION INTRAMUSCULAR; INTRAVENOUS at 12:05

## 2017-05-19 RX ADMIN — FENTANYL CITRATE 25 MCG: 50 INJECTION, SOLUTION INTRAMUSCULAR; INTRAVENOUS at 11:34

## 2017-05-19 RX ADMIN — SODIUM CHLORIDE, SODIUM LACTATE, POTASSIUM CHLORIDE, AND CALCIUM CHLORIDE 75 ML/HR: 600; 310; 30; 20 INJECTION, SOLUTION INTRAVENOUS at 10:53

## 2017-05-19 RX ADMIN — FENTANYL CITRATE 50 MCG: 50 INJECTION, SOLUTION INTRAMUSCULAR; INTRAVENOUS at 11:25

## 2017-05-19 RX ADMIN — DEXAMETHASONE SODIUM PHOSPHATE 4 MG: 4 INJECTION, SOLUTION INTRA-ARTICULAR; INTRALESIONAL; INTRAMUSCULAR; INTRAVENOUS; SOFT TISSUE at 11:32

## 2017-05-19 RX ADMIN — LIDOCAINE HYDROCHLORIDE 40 MG: 20 INJECTION, SOLUTION EPIDURAL; INFILTRATION; INTRACAUDAL; PERINEURAL at 11:18

## 2017-05-19 RX ADMIN — Medication 2 MG: at 13:16

## 2017-05-19 RX ADMIN — GLYCOPYRROLATE 0.2 MG: 0.2 INJECTION INTRAMUSCULAR; INTRAVENOUS at 11:09

## 2017-05-19 RX ADMIN — PROPOFOL 50 MG: 10 INJECTION, EMULSION INTRAVENOUS at 11:21

## 2017-05-19 RX ADMIN — Medication 2 MG: at 13:26

## 2017-05-19 RX ADMIN — Medication 2 MG: at 13:06

## 2017-05-19 RX ADMIN — PROPOFOL 150 MG: 10 INJECTION, EMULSION INTRAVENOUS at 11:18

## 2017-05-19 RX ADMIN — Medication 2 MG: at 12:56

## 2017-05-19 RX ADMIN — SODIUM CHLORIDE 1 G: 900 INJECTION INTRAVENOUS at 22:16

## 2017-05-19 RX ADMIN — SODIUM CHLORIDE, SODIUM LACTATE, POTASSIUM CHLORIDE, CALCIUM CHLORIDE: 600; 310; 30; 20 INJECTION, SOLUTION INTRAVENOUS at 11:05

## 2017-05-19 RX ADMIN — DOCUSATE SODIUM 100 MG: 100 CAPSULE, LIQUID FILLED ORAL at 18:05

## 2017-05-19 RX ADMIN — ONDANSETRON 4 MG: 2 INJECTION INTRAMUSCULAR; INTRAVENOUS at 11:30

## 2017-05-19 RX ADMIN — FAMOTIDINE 20 MG: 10 INJECTION, SOLUTION INTRAVENOUS at 10:53

## 2017-05-19 RX ADMIN — SODIUM CHLORIDE, SODIUM LACTATE, POTASSIUM CHLORIDE, AND CALCIUM CHLORIDE 75 ML/HR: 600; 310; 30; 20 INJECTION, SOLUTION INTRAVENOUS at 13:00

## 2017-05-19 NOTE — BRIEF OP NOTE
BRIEF OPERATIVE NOTE    Date of Procedure: 5/19/2017   Preoperative Diagnosis: BPH (benign prostatic hypertrophy) with urinary retention [N40.1, R33.8]  Postoperative Diagnosis: BPH (benign prostatic hypertrophy) with urinary retention [N40.1, R33.8]    Procedure(s):  CYSTOSCOPY TRANSURETHRAL RESECTION OF PROSTATE  PROSTATE BIOPSY NEEDLE; NO ULTRASOUND  Surgeon(s) and Role:      Harsha Lomeli MD - Primary         Assistant Staff:       Surgical Staff:  Circ-1: Miguel Angel Resendez RN  Circ-Relief: Johana Levy RN  Scrub Tech-1: Irl Left  Scrub RN-Relief: Johana Levy RN  Event Time In   Incision Start 1129   Incision Close 1234     Anesthesia: General   Estimated Blood Loss: 100 cc  Specimens:   ID Type Source Tests Collected by Time Destination   1 : prostate chips Preservative Prostate  Harsha Lomeli MD 5/19/2017 1131 Pathology      Findings: BPH  Complications: none  Implants:none    22 Fr 3 way Block with NSS CBI    Admit for 23 hrs- needs CBI/ Flushing of Block    Harsha Lomeli MD

## 2017-05-19 NOTE — ANESTHESIA POSTPROCEDURE EVALUATION
Post-Anesthesia Evaluation and Assessment    Patient: Cathie Tolbert MRN: [id number]  SSN: [id number]    YOB: 1926  Age: 80 y.o. Sex: male     VS from flow sheet    Cardiovascular Function/Vital Signs  Visit Vitals    /60 (BP 1 Location: Right arm, BP Patient Position: At rest)    Pulse (!) 55    Temp 35.1 °C (95.2 °F)    Resp 16    Ht 5' 8\" (1.727 m)    Wt 74.1 kg (163 lb 5 oz)    SpO2 100%    BMI 24.83 kg/m2       Patient is status post general anesthesia for Procedure(s):  CYSTOSCOPY TRANSURETHRAL RESECTION OF PROSTATE  PROSTATE BIOPSY NEEDLE; NO ULTRASOUND. Nausea/Vomiting: None    Postoperative hydration reviewed and adequate. Pain:  Pain Scale 1: Visual (05/19/17 1342)  Pain Intensity 1: 3 (05/19/17 1342)   Managed    Neurological Status:   Neuro (WDL): Within Defined Limits (05/19/17 1243)   At baseline    Mental Status and Level of Consciousness: Arousable    Pulmonary Status:   O2 Device: Nasal cannula (05/19/17 1243)   Adequate oxygenation and airway patent    Complications related to anesthesia: None    Post-anesthesia assessment completed.  No concerns    Signed By: Huang Ambrose MD     May 19, 2017

## 2017-05-19 NOTE — PROGRESS NOTES
conducted an initial consultation and Spiritual Assessment for Cece Street, who is a 80 y.o.,male. Patients Primary Language is: [place]. According to the patients EMR Rastafari Affiliation is: Altaf Burk.     The reason the Patient came to the hospital is: There are no active problems to display for this patient. The  provided the following Interventions:  Initiated a relationship of care and support. Explored issues of patricia, belief, spirituality and Scientology/ritual needs while hospitalized. Listened empathically. Provided information about Spiritual Care Services. Offered prayer and assurance of continued prayers on patient's behalf. Chart reviewed. The following outcomes where achieved:  Patient shared limited information about both their medical narrative and spiritual journey/beliefs.  confirmed Patient's Rastafari Affiliation. Patient processed feeling about current hospitalization. Patient expressed gratitude for 's visit. Assessment:  Patient does not have any Scientology/cultural needs that will affect patients preferences in health care. There are no spiritual or Scientology issues which require intervention at this time. Plan:  Chaplains will continue to follow and will provide pastoral care on an as needed/requested basis.  recommends bedside caregivers page  on duty if patient shows signs of acute spiritual or emotional distress.       [facility]   [phone]

## 2017-05-19 NOTE — ANESTHESIA PREPROCEDURE EVALUATION
Anesthetic History               Review of Systems / Medical History  Patient summary reviewed, nursing notes reviewed and pertinent labs reviewed    Pulmonary                   Neuro/Psych              Cardiovascular                       GI/Hepatic/Renal               Comments: BPH Endo/Other             Other Findings   Comments:   Risk Factors for Postoperative nausea/vomiting:       History of postoperative nausea/vomiting? NO       Female? NO       Motion sickness? NO       Intended opioid administration for postoperative analgesia? YES      Smoking Abstinence  Current Smoker? NO  Elective Surgery? YES  Seen preoperatively by anesthesiologist or proxy prior to day of surgery? YES  Pt abstained from smoking 24 hours prior to anesthesia?  N/A           Physical Exam    Airway  Mallampati: III  TM Distance: > 6 cm  Neck ROM: normal range of motion   Mouth opening: Normal     Cardiovascular    Rhythm: regular  Rate: normal         Dental  No notable dental hx       Pulmonary  Breath sounds clear to auscultation               Abdominal  GI exam deferred       Other Findings            Anesthetic Plan    ASA: 2  Anesthesia type: general          Induction: Intravenous  Anesthetic plan and risks discussed with: Patient

## 2017-05-19 NOTE — BRIEF OP NOTE
BRIEF OPERATIVE NOTE    Date of Procedure: 5/19/2017   Preoperative Diagnosis: BPH (benign prostatic hypertrophy) with urinary retention [N40.1, R33.8]  Postoperative Diagnosis: BPH (benign prostatic hypertrophy) with urinary retention [N40.1, R33.8]    Procedure(s):  CYSTOSCOPY TRANSURETHRAL RESECTION OF PROSTATE  PROSTATE BIOPSY NEEDLE; NO ULTRASOUND  Surgeon(s) and Role:      Harsha Lomeli MD - Primary         Assistant Staff:       Surgical Staff:  Circ-1: Miguel Angel Resendez RN  Circ-Relief: Johana Levy RN  Scrub Tech-1: Irl Left  Scrub RN-Relief: Johana Levy RN  Event Time In   Incision Start 1129   Incision Close 1234     Anesthesia: General   Estimated Blood Loss: 100 cc  Specimens:   ID Type Source Tests Collected by Time Destination   1 : prostate chips Preservative Prostate  Harsha Lomeli MD 5/19/2017 1131 Pathology      Findings: BPH  Pre-op PSA 04.6  Complications: none  Implants: none    Admit for 23 hr observation because of advanced age and need for CBI flushing of Block catheter    Harsha Lomeli MD

## 2017-05-19 NOTE — H&P
Mr. Hinkle Presumcody seen today for preop assessment and counseling for TURP     patient complains of irritative voiding symptoms-patient complains of nocturia 5-6 times per night associated with severe and sudden urgency episodes during the daytime during the past several months-patient has been treated with alpha-blocker and anticholinergic medication for relief of irritable bladder symptoms caused by BPH under the care of a urologist in [place] but managed by primary physician in [place] for the past 5 years with Ditropan and Flomax      Urinary stream is weak and intermittent-no dysuria-no fever flank pain or pain in the perineum-  No history of  tract disease, trauma, or surgery     PVR 5 71 cc on for May 2017     PSA 10.0 on 4 May 2017         Review of Systems:   CNS: No seizures syncope headaches dizziness or visual changes  Respiratory: No wheezing no shortness of breath no coughing-  Cardiovascular: No chest pains no palpitations  Intestinal: No dyspepsia diarrhea or constipation  Urinary: Irritable bladder symptoms for several years  Skeletal: Large joint arthritis  Endocrine: No diabetes or thyroid disease other:     Allergies: Allergies   Allergen Reactions    Lyrica [Pregabalin] Swelling    Clindamycin Other (comments)       C.  Diff      Medications:          Current Outpatient Prescriptions   Medication Sig Dispense Refill    oxybutynin (DITROPAN) 5 mg tablet Take 5 mg by mouth three (3) times daily.        tamsulosin (FLOMAX) 0.4 mg capsule Take 0.4 mg by mouth daily.        simvastatin (ZOCOR) 20 mg tablet Take by mouth nightly.        cholecalciferol, vitamin D3, 2,000 unit tab Take by Mouth.        OMEGA-3 FATTY ACIDS-FISH OIL PO 1,000 mg.        fluticasone (FLONASE) 50 mcg/actuation nasal spray 1 Spray.                  Past Medical History:   Diagnosis Date    BPH (benign prostatic hyperplasia)              Past Surgical History:   Procedure Laterality Date    HX CATARACT REMOVAL Bilateral 12/2016     dec/2016-left eye, jan/2017-right eye    HX CYST REMOVAL   1947    HX MOHS PROCEDURES   1980s     left ear            Family History   Problem Relation Age of Onset    Diabetes Father      Diabetes Brother           Physical Examination: Well-nourished mature male in no apparent distress  Neck: No masses nodes or bruits   lungs: Clear breath sounds bilaterally no wheezes rales or rhonchi  Heart: Regular sinus rhythm no murmurs gallops or rubs   Abdomen: Tender no palpable masses no organomegaly no bruits  Back: No percussion CVA tenderness on either side  Skin: Warm and dry no rash no lesions  Neurologic: No motor or sensory deficits in arms or legs   Genitalia: Normal penis, testes bilaterally, enlarged rounded smooth benign consistency prostate  No rectal masses induration or tenderness     Urinalysis: Negative dipstick/nitrite negative     Impression: Symptomatic BPH progression to jt urinary retention/failed alpha-blocker therapy  -Elevated PSA     Plan: TURP/transperineal prostate biopsy-plan overnight observation following TURP because of patient's advanced age     Counseling Note:  Indications for it technique a TURP have been described discussed utilizing Dr. Martin Hdez understands and accepts the risks of bleeding, infection, transfusions, postoperative urinary incontinence as well as retrograde ejaculation-persistence of obstruction and persistent irritative voiding symptoms are possible possibly this procedure     rtc 1 week postop     More than 1/2 of this 25 minute visit was spent in counselling and coordination of care, as described above.   Staci Mccurdy MD

## 2017-05-19 NOTE — IP AVS SNAPSHOT
Current Discharge Medication List  
  
START taking these medications Dose & Instructions Dispensing Information Comments Morning Noon Evening Bedtime  
 ciprofloxacin HCl 250 mg tablet Commonly known as:  CIPRO Your last dose was: Your next dose is:    
   
   
 Dose:  250 mg Take 1 Tab by mouth every twelve (12) hours for 5 days. Quantity:  10 Tab Refills:  0  
     
   
   
   
  
 docusate sodium 100 mg capsule Commonly known as:  Manoj Alvaro Your last dose was: Your next dose is:    
   
   
 Dose:  100 mg Take 1 Cap by mouth two (2) times a day for 10 days. Quantity:  20 Cap Refills:  0  
     
   
   
   
  
 oxyCODONE IR 5 mg immediate release tablet Commonly known as:  Mandy Fishow Your last dose was: Your next dose is:    
   
   
 Dose:  5 mg Take 1 Tab by mouth every four (4) hours as needed for Pain. Max Daily Amount: 30 mg.  
 Quantity:  20 Tab Refills:  0 CONTINUE these medications which have NOT CHANGED Dose & Instructions Dispensing Information Comments Morning Noon Evening Bedtime  
 cholecalciferol (vitamin D3) 2,000 unit Tab Your last dose was: Your next dose is: Take  by Mouth. Refills:  0  
     
   
   
   
  
 fluticasone 50 mcg/actuation nasal spray Commonly known as:  Andrey Alonzo Your last dose was: Your next dose is:    
   
   
 Dose:  1 Spray 1 Spray. Refills:  0 OMEGA-3 FATTY ACIDS-FISH OIL PO Your last dose was: Your next dose is:    
   
   
 Dose:  1000 mg  
1,000 mg. Refills:  0  
     
   
   
   
  
 oxybutynin 5 mg tablet Commonly known as:  VEUSCUYU Your last dose was: Your next dose is:    
   
   
 Dose:  5 mg Take 5 mg by mouth three (3) times daily. Refills:  0  
     
   
   
   
  
 simvastatin 20 mg tablet Commonly known as:  ZOCOR Your last dose was: Your next dose is: Take  by mouth nightly. Refills:  0  
     
   
   
   
  
 tamsulosin 0.4 mg capsule Commonly known as:  FLOMAX Your last dose was: Your next dose is:    
   
   
 Dose:  0.4 mg Take 0.4 mg by mouth daily. Refills:  0 Where to Get Your Medications Information on where to get these meds will be given to you by the nurse or doctor. ! Ask your nurse or doctor about these medications  
  ciprofloxacin HCl 250 mg tablet  
 docusate sodium 100 mg capsule  
 oxyCODONE IR 5 mg immediate release tablet

## 2017-05-19 NOTE — IP AVS SNAPSHOT
[address] Patient: Marianela Young MRN: [id number] :1926 You are allergic to the following Allergen Reactions Lyrica (Pregabalin) Swelling Clindamycin Other (comments) C. Diff Recent Documentation Height Weight BMI Smoking Status 1.727 m 74.1 kg 24.83 kg/m2 Former Smoker Emergency Contacts Name Discharge Info Relation Home Work Mobile Eros Heathatylor CAREGIVER [3] Daughter [21] [phone] Esteban Cline (Nephew) N/A  AT THIS TIME [6] Other Relative [6] [phone] About your hospitalization You were admitted on:  May 19, 2017 You last received care in the:  [facility]  You were discharged on:  May 20, 2017 Unit phone number:  [phone] Why you were hospitalized Your primary diagnosis was:  Not on File Providers Seen During Your Hospitalizations Provider Role Specialty Primary office phone Suellen Rubin MD Attending Provider Urology [phone] Your Primary Care Physician (PCP) Primary Care Physician Office Phone Office Fax Yady Huerta [phone] Follow-up Information Follow up With Details Comments Contact Info Suellen Rubin MD On 2017 May 22, 2017 @ 09:15 with Dr. Yaa Baxter. [address] [address] 
[phone] Abdi Warner MD On 2017 Appointment at 10:00am [address] [facility] [address] [place] [address] 
[phone] Your Appointments Monday May 22, 2017  1:45 PM EDT Office Visit with Suellen Rubin MD  
[facility] [address] [place] [address] [facility] [address] [address] [phone] Current Discharge Medication List  
  
START taking these medications Dose & Instructions Dispensing Information Comments Morning Noon Evening Bedtime  
 ciprofloxacin HCl 250 mg tablet Commonly known as:  CIPRO Your last dose was: Your next dose is:    
   
   
 Dose:  250 mg Take 1 Tab by mouth every twelve (12) hours for 5 days. Quantity:  10 Tab Refills:  0  
     
   
   
   
  
 docusate sodium 100 mg capsule Commonly known as:  Evonnerenée Carrillo Your last dose was: Your next dose is:    
   
   
 Dose:  100 mg Take 1 Cap by mouth two (2) times a day for 10 days. Quantity:  20 Cap Refills:  0  
     
   
   
   
  
 oxyCODONE IR 5 mg immediate release tablet Commonly known as:  Molly Rain Your last dose was: Your next dose is:    
   
   
 Dose:  5 mg Take 1 Tab by mouth every four (4) hours as needed for Pain. Max Daily Amount: 30 mg.  
 Quantity:  20 Tab Refills:  0 CONTINUE these medications which have NOT CHANGED Dose & Instructions Dispensing Information Comments Morning Noon Evening Bedtime  
 cholecalciferol (vitamin D3) 2,000 unit Tab Your last dose was: Your next dose is: Take  by Mouth. Refills:  0  
     
   
   
   
  
 fluticasone 50 mcg/actuation nasal spray Commonly known as:  Lynetteerkenia Everett Your last dose was: Your next dose is:    
   
   
 Dose:  1 Spray 1 Spray. Refills:  0 OMEGA-3 FATTY ACIDS-FISH OIL PO Your last dose was: Your next dose is:    
   
   
 Dose:  1000 mg  
1,000 mg. Refills:  0  
     
   
   
   
  
 oxybutynin 5 mg tablet Commonly known as:  MIINNZJN Your last dose was: Your next dose is:    
   
   
 Dose:  5 mg Take 5 mg by mouth three (3) times daily. Refills:  0  
     
   
   
   
  
 simvastatin 20 mg tablet Commonly known as:  ZOCOR Your last dose was: Your next dose is: Take  by mouth nightly.   
 Refills:  0  
     
   
   
   
  
 tamsulosin 0.4 mg capsule Commonly known as:  FLOMAX Your last dose was: Your next dose is:    
   
   
 Dose:  0.4 mg Take 0.4 mg by mouth daily. Refills:  0 Where to Get Your Medications Information on where to get these meds will be given to you by the nurse or doctor. ! Ask your nurse or doctor about these medications  
  ciprofloxacin HCl 250 mg tablet  
 docusate sodium 100 mg capsule  
 oxyCODONE IR 5 mg immediate release tablet Discharge Instructions Light activity Reg diet Block to leg bag/ plug CBI port Rx Cipro, Colace, Oxycodone 
 
rtc 2 days Shade Delgado MD 
 
 
 
 
 
 
 
 
 
 
 
 
DISCHARGE SUMMARY from Nurse The following personal items are in your possession at time of discharge: 
 
Dental Appliances: None Visual Aid: None Hearing Aids/Status: Bilateral 
Home Medications: None Jewelry: None Clothing: Undergarments, Footwear, Shorts, Pants, Socks Other Valuables: None PATIENT INSTRUCTIONS: 
 
 
F-face looks uneven A-arms unable to move or move unevenly S-speech slurred or non-existent T-time-call 911 as soon as signs and symptoms begin-DO NOT go Back to bed or wait to see if you get better-TIME IS BRAIN. Warning Signs of HEART ATTACK Call 911 if you have these symptoms:  Chest discomfort. Most heart attacks involve discomfort in the center of the chest that lasts more than a few minutes, or that goes away and comes back. It can feel like uncomfortable pressure, squeezing, fullness, or pain.  Discomfort in other areas of the upper body. Symptoms can include pain or discomfort in one or both arms, the back, neck, jaw, or stomach.  Shortness of breath with or without chest discomfort.  
 Other signs may include breaking out in a cold sweat, nausea, or lightheadedness. Don't wait more than five minutes to call [address]! Fast action can save your life. Calling 911 is almost always the fastest way to get lifesaving treatment. Emergency Medical Services staff can begin treatment when they arrive  up to an hour sooner than if someone gets to the hospital by car. The discharge information has been reviewed with the patient. The patient verbalized understanding. Discharge medications reviewed with the patient and appropriate educational materials and side effects teaching were provided. Patient armband removed and shredded Flow Traders[employer] Activation Thank you for requesting access to AutomateIt. Please follow the instructions below to securely access and download your online medical record. AutomateIt allows you to send messages to your doctor, view your test results, renew your prescriptions, schedule appointments, and more. How Do I Sign Up? 1. In your internet browser, go to [email or web address] 
2. Click on the First Time User? Click Here link in the Sign In box. You will be redirect to the New Member Sign Up page. 3. Enter your AutomateIt Access Code exactly as it appears below. You will not need to use this code after youve completed the sign-up process. If you do not sign up before the expiration date, you must request a new code. AutomateIt Access Code: Activation code not generated Current AutomateIt Status: Patient Declined (This is the date your AutomateIt access code will ) 4. Enter the last four digits of your Social Security Number (xxxx) and Date of Birth (mm/dd/yyyy) as indicated and click Submit. You will be taken to the next sign-up page. 5. Create a AutomateIt ID. This will be your AutomateIt login ID and cannot be changed, so think of one that is secure and easy to remember. 6. Create a AutomateIt password. You can change your password at any time. 7. Enter your Password Reset Question and Answer. This can be used at a later time if you forget your password. 8. Enter your e-mail address. You will receive e-mail notification when new information is available in [address] 9. Click Sign Up. You can now view and download portions of your medical record. 10. Click the Download Summary menu link to download a portable copy of your medical information. Additional Information If you have questions, please visit the Frequently Asked Questions section of the Punchh website at [email or web address] Remember, [employer]unch[employer] is NOT to be used for urgent needs. For medical emergencies, dial 911. Laser Transurethral Resection of the Prostate (TURP): What to Expect at [facility] Your Recovery Transurethral resection of the prostate (TURP) is surgery to remove prostate tissue. This is done when an overgrown prostate gland is pressing on the urethra and making it hard for a man to urinate. After laser surgery, you will have a urinary catheter for a short time. It is a flexible plastic tube used to drain urine from your bladder when you can't urinate on your own. If it is still in place when you go home, your doctor will give you instructions on how to care for it. For several days after surgery, you may feel burning when you urinate. Your urine may be pink for 1 to 3 weeks after surgery. You also may have bladder cramps, or spasms. Your doctor may give you medicine to help control the spasms. You may still feel like you need to urinate often in the weeks after your surgery. It often takes up to 6 weeks for this to get better. After you have healed, you may have less trouble urinating. You may have better control over starting and stopping your urine stream. And you may feel like you get more relief when you urinate. After laser TURP, most men can return to work or many of their usual tasks in a few days.  But for about 2 weeks, try to avoid heavy lifting and strenuous activities that might put extra pressure on your bladder. Most men still can have erections after surgery (if they were able to have them before surgery). But they may not ejaculate when they have an orgasm. Semen may go into the bladder instead of out through the penis. This is called retrograde ejaculation. It does not hurt and is not harmful to your health. This care sheet gives you a general idea about how long it will take for you to recover. But each person recovers at a different pace. Follow the steps below to get better as quickly as possible. How can you care for yourself at home? Activity · Rest when you feel tired. · Be active. Walking is a good choice. · Allow your body to heal. Don't move quickly or lift anything heavy until you are feeling better. · Ask your doctor when you can drive again. · Many people are able to return to work within a few days after surgery. · Do not put anything in your rectum, such as an enema or suppository, for 4 to 6 weeks after the surgery. · You may shower and take baths when your doctor says it is okay. · Ask your doctor when it is okay for you to have sex. Diet · You can eat your normal diet. If your stomach is upset, try bland, low-fat foods like plain rice, broiled chicken, toast, and yogurt. · If your bowel movements are not regular right after surgery, try to avoid constipation and straining. Drink plenty of water. Your doctor may suggest fiber, a stool softener, or a mild laxative. Medicines · Your doctor will tell you if and when you can restart your medicines. He or she will also give you instructions about taking any new medicines. · If you take aspirin or some other blood thinner, be sure to talk to your doctor. He or she will tell you if and when to start taking those medicines again. Make sure that you understand exactly what your doctor wants you to do. · Be safe with medicines. Read and follow all instructions on the label.  
¨ If the doctor gave you a prescription medicine for pain, take it as prescribed. ¨ If you are not taking a prescription pain medicine, ask your doctor if you can take an over-the-counter medicine. · Take your antibiotics as directed. Do not stop taking them just because you feel better. You need to take the full course of antibiotics. Follow-up care is a key part of your treatment and safety. Be sure to make and go to all appointments, and call your doctor if you are having problems. It's also a good idea to know your test results and keep a list of the medicines you take. When should you call for help? Call 911 anytime you think you may need emergency care. For example, call if: 
· You passed out (lost consciousness). · You have symptoms of a blood clot in your lung (called a pulmonary embolism). These may include: 
¨ Sudden chest pain. ¨ Trouble breathing. ¨ Coughing up blood. Call your doctor now or seek immediate medical care if: 
· You have symptoms of infection, such as: 
¨ Increased pain, swelling, warmth, or redness around the cut. ¨ Red streaks leading from the cut. ¨ Pus draining from the cut. ¨ A fever. · You have new or more blood or blood clots in your urine. · You have pain in the flank, which is just below the rib cage and above the waist on either side of the back. · You have new or worse pain or burning when you urinate. · You cannot urinate, or you have trouble urinating. · You have a new or worse problem with controlling your bladder. · You have signs of a blood clot, such as: 
¨ Pain in your calf, back of the knee, thigh, or groin. ¨ Redness and swelling in your leg or groin. Watch closely for changes in your health, and be sure to contact your doctor if: 
· You are not getting better as expected. · You do not have a bowel movement after taking a laxative. Where can you learn more? Go to http://tu-angela.info/.  
Enter G945 in the search box to learn more about \"Laser Transurethral Resection of the Prostate (TURP): What to Expect at Home. \" Current as of: October 31, 2016 Content Version: 11.2 © 2006-2017 [employer]. Care instructions adapted under license by [employer] (which disclaims liability or warranty for this information). If you have questions about a medical condition or this instruction, always ask your healthcare professional. [address] any warranty or liability for your use of this information. 
  
 
 
 
 
 
 
Discharge Orders None [facility] Announcement We are excited to announce that we are making your provider's discharge notes available to you in hoozin. You will see these notes when they are completed and signed by the physician that discharged you from your recent hospital stay. If you have any questions or concerns about any information you see in hoozin, please call the Health Information Department where you were seen or reach out to your Primary Care Provider for more information about your plan of care. General Information Please provide this summary of care documentation to your next provider. Patient Signature:  ____________________________________________________________ Date:  ____________________________________________________________  
  
[facility] Provider Signature:  ____________________________________________________________ Date:  ____________________________________________________________

## 2017-05-19 NOTE — ROUTINE PROCESS
TRANSFER - OUT REPORT:    Verbal report given to [facility] RN(name) on Nico Espitia  being transferred to [address] [place] [address](unit) for routine progression of care       Report consisted of patients Situation, Background, Assessment and   Recommendations(SBAR). Information from the following report(s) SBAR, Kardex, Procedure Summary and MAR was reviewed with the receiving nurse. Lines:   Peripheral IV 05/19/17 Left Hand (Active)   Site Assessment Clean, dry, & intact 5/19/2017  1:00 PM   Phlebitis Assessment 0 5/19/2017  1:00 PM   Infiltration Assessment 0 5/19/2017  1:00 PM   Dressing Status Clean, dry, & intact 5/19/2017  1:00 PM   Dressing Type Tape;Transparent 5/19/2017  1:00 PM   Hub Color/Line Status Pink; Infusing;Flushed;Patent 5/19/2017  1:00 PM        Opportunity for questions and clarification was provided.       Patient transported with:   O2 @ 3 liters  Tech

## 2017-05-20 VITALS
HEART RATE: 64 BPM | RESPIRATION RATE: 16 BRPM | TEMPERATURE: 98.8 F | DIASTOLIC BLOOD PRESSURE: 52 MMHG | WEIGHT: 163.31 LBS | OXYGEN SATURATION: 100 % | BODY MASS INDEX: 24.75 KG/M2 | HEIGHT: 68 IN | SYSTOLIC BLOOD PRESSURE: 146 MMHG

## 2017-05-20 LAB
ANION GAP BLD CALC-SCNC: 6 MMOL/L (ref 3–18)
BUN SERPL-MCNC: 19 MG/DL (ref 7–18)
BUN/CREAT SERPL: 23 (ref 12–20)
CALCIUM SERPL-MCNC: 8.3 MG/DL (ref 8.5–10.1)
CHLORIDE SERPL-SCNC: 107 MMOL/L (ref 100–108)
CO2 SERPL-SCNC: 26 MMOL/L (ref 21–32)
CREAT SERPL-MCNC: 0.81 MG/DL (ref 0.6–1.3)
ERYTHROCYTE [DISTWIDTH] IN BLOOD BY AUTOMATED COUNT: 14 % (ref 11.6–14.5)
GLUCOSE SERPL-MCNC: 135 MG/DL (ref 74–99)
HCT VFR BLD AUTO: 34.4 % (ref 36–48)
HGB BLD-MCNC: 11.3 G/DL (ref 13–16)
MCH RBC QN AUTO: 28.2 PG (ref 24–34)
MCHC RBC AUTO-ENTMCNC: 32.8 G/DL (ref 31–37)
MCV RBC AUTO: 85.8 FL (ref 74–97)
PLATELET # BLD AUTO: 150 K/UL (ref 135–420)
PMV BLD AUTO: 11.5 FL (ref 9.2–11.8)
POTASSIUM SERPL-SCNC: 4.6 MMOL/L (ref 3.5–5.5)
RBC # BLD AUTO: 4.01 M/UL (ref 4.7–5.5)
SODIUM SERPL-SCNC: 139 MMOL/L (ref 136–145)
WBC # BLD AUTO: 8.5 K/UL (ref 4.6–13.2)

## 2017-05-20 PROCEDURE — 77030010545

## 2017-05-20 PROCEDURE — 85027 COMPLETE CBC AUTOMATED: CPT | Performed by: UROLOGY

## 2017-05-20 PROCEDURE — 80048 BASIC METABOLIC PNL TOTAL CA: CPT | Performed by: UROLOGY

## 2017-05-20 PROCEDURE — 36415 COLL VENOUS BLD VENIPUNCTURE: CPT | Performed by: UROLOGY

## 2017-05-20 PROCEDURE — 99218 HC RM OBSERVATION: CPT

## 2017-05-20 PROCEDURE — 77030027138 HC INCENT SPIROMETER -A

## 2017-05-20 PROCEDURE — 74011000258 HC RX REV CODE- 258: Performed by: UROLOGY

## 2017-05-20 PROCEDURE — 74011250637 HC RX REV CODE- 250/637: Performed by: UROLOGY

## 2017-05-20 PROCEDURE — 74011250636 HC RX REV CODE- 250/636: Performed by: UROLOGY

## 2017-05-20 RX ORDER — CIPROFLOXACIN 250 MG/1
250 TABLET, FILM COATED ORAL EVERY 12 HOURS
Qty: 10 TAB | Refills: 0 | Status: SHIPPED | OUTPATIENT
Start: 2017-05-20 | End: 2017-05-25

## 2017-05-20 RX ORDER — DOCUSATE SODIUM 100 MG/1
100 CAPSULE, LIQUID FILLED ORAL 2 TIMES DAILY
Qty: 20 CAP | Refills: 0 | Status: SHIPPED | OUTPATIENT
Start: 2017-05-20 | End: 2017-05-30

## 2017-05-20 RX ORDER — OXYCODONE HYDROCHLORIDE 5 MG/1
5 TABLET ORAL
Qty: 20 TAB | Refills: 0 | Status: SHIPPED | OUTPATIENT
Start: 2017-05-20 | End: 2017-08-11 | Stop reason: ALTCHOICE

## 2017-05-20 RX ADMIN — SODIUM CHLORIDE 1 G: 900 INJECTION INTRAVENOUS at 07:00

## 2017-05-20 RX ADMIN — DOCUSATE SODIUM 100 MG: 100 CAPSULE, LIQUID FILLED ORAL at 09:46

## 2017-05-20 RX ADMIN — Medication 10 ML: at 04:56

## 2017-05-20 RX ADMIN — SODIUM CHLORIDE 100 ML/HR: 900 INJECTION, SOLUTION INTRAVENOUS at 04:53

## 2017-05-20 NOTE — OP NOTES
[address]    Name:  Marilu Jim  MR#:  [id number]  :  1926  Account #:  [de-identified]  Date of Adm:  2017  Date of Surgery:  2017      PREOPERATIVE DIAGNOSIS: Obstructive prostatism with urinary  retention. POSTOPERATIVE DIAGNOSIS: Obstructive prostatism with urinary  retention. PROCEDURES PERFORMED: Transurethral resection of the  prostate. ANESTHESIA: General by LMA. ESTIMATED BLOOD LOSS: 100 mL. SPECIMENS REMOVED: TURP chips. TUBES AND DRAINS: A 22-Swedish 3-way Block catheter with normal  saline solution continuous bladder irrigation. INDICATIONS FOR OPERATION: A 51-year-old gentleman with  symptomatic BPH for several years on alpha-blocker therapy and  anticholinergic medication for the past 5 years, progressing to urinary  retention while on these medications. He is now admitted for TUR of  the prostate to relieve the anatomic obstruction of the bladder outlet in  jt urinary retention. His preoperative PSA is 10.0. DESCRIPTION OF PROCEDURE: After establishing adequate general  anesthesia by LMA, the patient was placed in the dorsal lithotomy  position with legs suspended in Ventura stirrups, cushioned with soft foam  rubber padding. The external genitalia were then prepped with  antibiotic scrub and solution and draped in a sterile manner. A 27-  Swedish continuous flow bipolar resectoscope was passed with visual  obturator in place and inspection of the urethra and bladder showed  normal urothelium throughout. There were no strictures, stones or  tumors evident. There was marked trabeculation of the bladder with  multiple large cellules and several diverticula arising from the lateral  walls. The ureteral orifices were both slit-like in appearance, located  laterally on the trigone. Clear effluxes were observed from both sides.   The median lobe of the prostate is markedly enlarged and protruding  intravesically and obscuring the direct visualization of the ureteral  orifices. The lateral lobes of the prostate are large and coapting in an  interdigitating fashion in the midline. There is marked injection and  friability of blood vessels in the bladder outlet and prostatic channel. Transurethral resection of the prostate was then performed, resecting  the lateral lobes and median lobes and anterior lobe, excavating a  wide channel between the bladder neck and the proximal  verumontanum. The TURP chips were evacuated from the bladder  using a Bradshaw evacuator. Visual inspection showed removal of all the  prostate chips specimens. The bladder was filled to capacity with  saline solution and the resectoscope was removed. Application of  suprapubic pressure produced a good urinary stream which subsided  on withdrawal of suprapubic pressure. Cautery using the button  electrode was used to obtain strict hemostasis with no signs of active  bleeding upon completion of the cautery process. The 22-Macedonian Block  catheter was attached to a gravity bag and normal saline solution  containing  irrigant was infused at a rate to obtain a clear efflux of  irrigant into the drainage system. The procedure overall was  uncomplicated and well tolerated by the patient who was taken from  the operating room to the recovery room in good condition. The patient will be kept for a 23-hour observation because of the need  to maintain CBI for several hours postoperatively.         MD ELISABETH Cruz / TW  D:  05/19/2017   13:01  T:  05/19/2017   20:05  Job #:  [id number]

## 2017-05-20 NOTE — DISCHARGE INSTRUCTIONS
Light activity  Reg diet  Block to leg bag/ plug CBI port  Rx Cipro, Colace, Oxycodone    rtc 2 days    Clarence San MD                          DISCHARGE SUMMARY from Nurse    The following personal items are in your possession at time of discharge:    Dental Appliances: None  Visual Aid: None  Hearing Aids/Status: Bilateral  Home Medications: None  Jewelry: None  Clothing: Undergarments, Footwear, Shorts, Pants, Socks  Other Valuables: None             PATIENT INSTRUCTIONS:    After general anesthesia or intravenous sedation, for 24 hours or while taking prescription Narcotics:  · Limit your activities  · Do not drive and operate hazardous machinery  · Do not make important personal or business decisions  · Do  not drink alcoholic beverages  · If you have not urinated within 8 hours after discharge, please contact your surgeon on call. Report the following to your surgeon:  · Excessive pain, swelling, redness or odor of or around the surgical area  · Temperature over 100.5  · Nausea and vomiting lasting longer than 4 hours or if unable to take medications  · Any signs of decreased circulation or nerve impairment to extremity: change in color, persistent  numbness, tingling, coldness or increase pain  · Urine output suddenly decreases or stops, large blood clots,  foul smelling urine. · Any questions        What to do at Home:  Recommended activity: Activity as tolerated    *  Please give a list of your current medications to your Primary Care Provider. *  Please update this list whenever your medications are discontinued, doses are      changed, or new medications (including over-the-counter products) are added. *  Please carry medication information at all times in case of emergency situations.           These are general instructions for a healthy lifestyle:    No smoking/ No tobacco products/ Avoid exposure to second hand smoke    Surgeon General's Warning:  Quitting smoking now greatly reduces serious risk to your health. Obesity, smoking, and sedentary lifestyle greatly increases your risk for illness    A healthy diet, regular physical exercise & weight monitoring are important for maintaining a healthy lifestyle    You may be retaining fluid if you have a history of heart failure or if you experience any of the following symptoms:  Weight gain of 3 pounds or more overnight or 5 pounds in a week, increased swelling in our hands or feet or shortness of breath while lying flat in bed. Please call your doctor as soon as you notice any of these symptoms; do not wait until your next office visit. Recognize signs and symptoms of STROKE:    F-face looks uneven    A-arms unable to move or move unevenly    S-speech slurred or non-existent    T-time-call 911 as soon as signs and symptoms begin-DO NOT go       Back to bed or wait to see if you get better-TIME IS BRAIN. Warning Signs of HEART ATTACK     Call 911 if you have these symptoms:   Chest discomfort. Most heart attacks involve discomfort in the center of the chest that lasts more than a few minutes, or that goes away and comes back. It can feel like uncomfortable pressure, squeezing, fullness, or pain.  Discomfort in other areas of the upper body. Symptoms can include pain or discomfort in one or both arms, the back, neck, jaw, or stomach.  Shortness of breath with or without chest discomfort.  Other signs may include breaking out in a cold sweat, nausea, or lightheadedness. Don't wait more than five minutes to call 911 - MINUTES MATTER! Fast action can save your life. Calling 911 is almost always the fastest way to get lifesaving treatment. Emergency Medical Services staff can begin treatment when they arrive -- up to an hour sooner than if someone gets to the hospital by car. The discharge information has been reviewed with the patient. The patient verbalized understanding.     Discharge medications reviewed with the patient and appropriate educational materials and side effects teaching were provided. Patient armband removed and shredded      MyChart Activation    Thank you for requesting access to [employer]. Please follow the instructions below to securely access and download your online medical record. [employer] allows you to send messages to your doctor, view your test results, renew your prescriptions, schedule appointments, and more. How Do I Sign Up? 1. In your internet browser, go to [email or web address]  2. Click on the First Time User? Click Here link in the Sign In box. You will be redirect to the New Member Sign Up page. 3. Enter your [employer] Access Code exactly as it appears below. You will not need to use this code after youve completed the sign-up process. If you do not sign up before the expiration date, you must request a new code. TempMinet Access Code: Activation code not generated  Current [facility] Status: Patient Declined (This is the date your [employer] access code will )    4. Enter the last four digits of your Social Security Number (xxxx) and Date of Birth (mm/dd/yyyy) as indicated and click Submit. You will be taken to the next sign-up page. 5. Create a [facility] ID. This will be your [employer] login ID and cannot be changed, so think of one that is secure and easy to remember. 6. Create a [employer] password. You can change your password at any time. 7. Enter your Password Reset Question and Answer. This can be used at a later time if you forget your password. 8. Enter your e-mail address. You will receive e-mail notification when new information is available in [address]. 9. Click Sign Up. You can now view and download portions of your medical record. 10. Click the Download Summary menu link to download a portable copy of your medical information.     Additional Information    If you have questions, please visit the Frequently Asked Questions section of the [employer] website at [email or web address] Remember, MyChart is NOT to be used for urgent needs. For medical emergencies, dial 911. Laser Transurethral Resection of the Prostate (TURP): What to Expect at [facility]    Transurethral resection of the prostate (TURP) is surgery to remove prostate tissue. This is done when an overgrown prostate gland is pressing on the urethra and making it hard for a man to urinate. After laser surgery, you will have a urinary catheter for a short time. It is a flexible plastic tube used to drain urine from your bladder when you can't urinate on your own. If it is still in place when you go home, your doctor will give you instructions on how to care for it. For several days after surgery, you may feel burning when you urinate. Your urine may be pink for 1 to 3 weeks after surgery. You also may have bladder cramps, or spasms. Your doctor may give you medicine to help control the spasms. You may still feel like you need to urinate often in the weeks after your surgery. It often takes up to 6 weeks for this to get better. After you have healed, you may have less trouble urinating. You may have better control over starting and stopping your urine stream. And you may feel like you get more relief when you urinate. After laser TURP, most men can return to work or many of their usual tasks in a few days. But for about 2 weeks, try to avoid heavy lifting and strenuous activities that might put extra pressure on your bladder. Most men still can have erections after surgery (if they were able to have them before surgery). But they may not ejaculate when they have an orgasm. Semen may go into the bladder instead of out through the penis. This is called retrograde ejaculation. It does not hurt and is not harmful to your health. This care sheet gives you a general idea about how long it will take for you to recover. But each person recovers at a different pace.  Follow the steps below to get better as quickly as possible. How can you care for yourself at home? Activity  · Rest when you feel tired. · Be active. Walking is a good choice. · Allow your body to heal. Don't move quickly or lift anything heavy until you are feeling better. · Ask your doctor when you can drive again. · Many people are able to return to work within a few days after surgery. · Do not put anything in your rectum, such as an enema or suppository, for 4 to 6 weeks after the surgery. · You may shower and take baths when your doctor says it is okay. · Ask your doctor when it is okay for you to have sex. Diet  · You can eat your normal diet. If your stomach is upset, try bland, low-fat foods like plain rice, broiled chicken, toast, and yogurt. · If your bowel movements are not regular right after surgery, try to avoid constipation and straining. Drink plenty of water. Your doctor may suggest fiber, a stool softener, or a mild laxative. Medicines  · Your doctor will tell you if and when you can restart your medicines. He or she will also give you instructions about taking any new medicines. · If you take aspirin or some other blood thinner, be sure to talk to your doctor. He or she will tell you if and when to start taking those medicines again. Make sure that you understand exactly what your doctor wants you to do. · Be safe with medicines. Read and follow all instructions on the label. ¨ If the doctor gave you a prescription medicine for pain, take it as prescribed. ¨ If you are not taking a prescription pain medicine, ask your doctor if you can take an over-the-counter medicine. · Take your antibiotics as directed. Do not stop taking them just because you feel better. You need to take the full course of antibiotics. Follow-up care is a key part of your treatment and safety. Be sure to make and go to all appointments, and call your doctor if you are having problems.  It's also a good idea to know your test results and keep a list of the medicines you take. When should you call for help? Call 911 anytime you think you may need emergency care. For example, call if:  · You passed out (lost consciousness). · You have symptoms of a blood clot in your lung (called a pulmonary embolism). These may include:  ¨ Sudden chest pain. ¨ Trouble breathing. ¨ Coughing up blood. Call your doctor now or seek immediate medical care if:  · You have symptoms of infection, such as:  ¨ Increased pain, swelling, warmth, or redness around the cut. ¨ Red streaks leading from the cut. ¨ Pus draining from the cut. ¨ A fever. · You have new or more blood or blood clots in your urine. · You have pain in the flank, which is just below the rib cage and above the waist on either side of the back. · You have new or worse pain or burning when you urinate. · You cannot urinate, or you have trouble urinating. · You have a new or worse problem with controlling your bladder. · You have signs of a blood clot, such as:  ¨ Pain in your calf, back of the knee, thigh, or groin. ¨ Redness and swelling in your leg or groin. Watch closely for changes in your health, and be sure to contact your doctor if:  · You are not getting better as expected. · You do not have a bowel movement after taking a laxative. Where can you learn more? Go to http://tu-angela.info/. Enter K108 in the search box to learn more about \"Laser Transurethral Resection of the Prostate (TURP): What to Expect at Home. \"  Current as of: October 31, 2016  Content Version: 11.2  © 5549-7751 [employer]. Care instructions adapted under license by [employer] (which disclaims liability or warranty for this information).  If you have questions about a medical condition or this instruction, always ask your healthcare professional. [address] any warranty or liability for your use of this information.

## 2017-05-20 NOTE — ROUTINE PROCESS
Bedside and Verbal shift change report given to Kelly Chin (oncoming nurse) by Yvrose Fonseca RN (offgoing nurse). Report included the following information SBAR, Kardex, MAR and Recent Results. SITUATION:    Code Status: Full Code   Reason for Admission: BPH (benign prostatic hypertrophy) with urinary retention [N40.1, R33.8]    [facility] day: 0   Problem List:       Hospital Problems  Date Reviewed: 5/19/2017    None          BACKGROUND:    Past Medical History:   Past Medical History:   Diagnosis Date    BPH (benign prostatic hyperplasia)          Patient taking anticoagulants no     ASSESSMENT:    Changes in Assessment Throughout Shift: No     Patient has Central Line: no Reasons if yes: No   Patient has Block Cath: no Reasons if yes: None      Last Vitals:     Vitals:    05/19/17 1350 05/19/17 1457 05/19/17 2000 05/20/17 0654   BP: 155/65 150/60 114/57 95/42   Pulse: (!) 59 (!) 55 69 (!) 58   Resp: 13 16 16 17   Temp:  95.2 °F (35.1 °C) 96.6 °F (35.9 °C) 96.8 °F (36 °C)   SpO2: 100% 100% 100% 100%   Weight:       Height:            IV and DRAINS (will only show if present)   Peripheral IV 05/19/17 Left Hand-Site Assessment: Clean, dry, & intact     WOUND (if present)   Wound Type:  none   Dressing present Dressing Present : No   Wound Concerns/Notes:  none     PAIN    Pain Assessment    Pain Intensity 1: 0 (05/19/17 2018)    Pain Location 1: Penis    Pain Intervention(s) 1: Medication (see MAR)    Patient Stated Pain Goal: 0  o Interventions for Pain:  none  o Intervention effective: no  o Time of last intervention: none   o Reassessment Completed: no      Last 3 Weights:  Last 3 Recorded Weights in this Encounter    05/10/17 1318 05/19/17 1056   Weight: 76.2 kg (168 lb) 74.1 kg (163 lb 5 oz)     Weight change:      INTAKE/OUPUT    Current Shift:      Last three shifts: 05/18 1901 - 05/20 0700  In: 6700 [P.O.:200;  I.V.:500]  Out: 99935 [Urine:66702]     LAB RESULTS     Recent Labs 05/20/17   0301  05/19/17   1605   WBC  8.5  7.2   HGB  11.3*  12.6*   HCT  34.4*  37.7   PLT  150  130*        Recent Labs      05/20/17   0301   NA  139   K  4.6   GLU  135*   BUN  19*   CREA  0.81   CA  8.3*       RECOMMENDATIONS AND DISCHARGE PLANNING     1. Pending tests/procedures/ Plan of Care or Other Needs: TBD     2. Discharge plan for patient and Needs/Barriers: TBD    3. Estimated Discharge Date: TBD Posted on Whiteboard in Patients Room: no      4. The patient's care plan was reviewed with the oncoming nurse. \"HEALS\" SAFETY CHECK      Fall Risk    Total Score: 1    Safety Measures:      A safety check occurred in the patient's room between off going nurse and oncoming nurse listed above. The safety check included the below items  Area Items   H  High Alert Medications - Verify all high alert medication drips (heparin, PCA, etc.)   E  Equipment - Suction is set up for ALL patients (with abena)  - Red plugs utilized for all equipment (IV pumps, etc.)  - WOWs wiped down at end of shift.  - Room stocked with oxygen, suction, and other unit-specific supplies   A  Alarms - Bed alarm is set for fall risk patients  - Ensure chair alarm is in place and activated if patient is up in a chair   L  Lines - Check IV for any infiltration  - Block bag is empty if patient has a Block   - Tubing and IV bags are labeled   S  Safety   - Room is clean, patient is clean, and equipment is clean. - Hallways are clear from equipment besides carts. - Fall bracelet on for fall risk patients  - Ensure room is clear and free of clutter  - Suction is set up for ALL patients (with abena)  - Hallways are clear from equipment besides carts.    - Isolation precautions followed, supplies available outside room, sign posted     Flash Dunaway RN

## 2017-05-20 NOTE — ROUTINE PROCESS
Stable. Bladder irrigation continues. Family member in the room. Denies pain, nausea, or vomiting. Active bowel sounds and passing flatus. Bed to the lowest position and call light within reach. Will continue with the pt plan of care.

## 2017-05-20 NOTE — ROUTINE PROCESS
Pt provided verbal and written discharge instruction and verbalized understanding of information. Pt received medication prescription(s). Peripheral IV d/c w/ tip intact. Pt armband removed and shredded. Leg bag placed and irrigation lumen capped. Pt transported off the unit by wheelchair in stable condition w/ no signs of acute distress.

## 2017-05-20 NOTE — PROGRESS NOTES
Urology   Note    VS's wnl/ afebrile    Block output is good with CBI- no clots    PE:   abd beniign/ nontender    Lab:   WBC 8.5  Hct 34.4  Cr  0.8    Imp: stable s/p TURP    Plan- discharge to home- reg diet, light activity, Block to leg bag x 2 days, Rx Cipro, Colace, Oxycodone  rtc 2 days for voiding trial    Nicola Graham MD

## 2017-05-20 NOTE — PROGRESS NOTES
Pt is alert and orientated x4 and calmly resting in bed. No signs of acute distress. Neuromotor function intact and can follow commands. Pupils +2 and reactive. Face symmetrical and speech clear. Daughters at bedside and assisting patient. Lung sounds diminished on R & L lower anterior lobes. Pt used ICS x3 ( < 2200 0 and lungs sounds become clear  Equal chest expansion. Bowel sounds present w/ no tenderness or pain w/ palpitating the abdomen or pelvic regions. Peripheral pulses present w/ cap refill of < 3 sec, warm to touch, sensation present. Pt's irrigation system and arnett are patent. Output is clear, blood tinged w/o large clots. Pt has no c/o of pain. Pt ambulating in the room w/ family members. Call light in reach and bed in lowest position.

## 2017-05-22 ENCOUNTER — OFFICE VISIT (OUTPATIENT)
Dept: UROLOGY | Age: 82
End: 2017-05-22

## 2017-05-22 VITALS
SYSTOLIC BLOOD PRESSURE: 170 MMHG | HEART RATE: 74 BPM | HEIGHT: 68 IN | BODY MASS INDEX: 24.71 KG/M2 | DIASTOLIC BLOOD PRESSURE: 65 MMHG | WEIGHT: 163 LBS | TEMPERATURE: 98.4 F | OXYGEN SATURATION: 98 %

## 2017-05-22 DIAGNOSIS — R35.0 URINARY FREQUENCY: Primary | ICD-10-CM

## 2017-05-22 DIAGNOSIS — N40.1 BPH LOC W URIN OBS/LUTS: ICD-10-CM

## 2017-05-22 LAB
BILIRUB UR QL STRIP: NEGATIVE
GLUCOSE UR-MCNC: NEGATIVE MG/DL
KETONES P FAST UR STRIP-MCNC: NEGATIVE MG/DL
PH UR STRIP: 5.5 [PH] (ref 4.6–8)
PROT UR QL STRIP: NORMAL MG/DL
SP GR UR STRIP: 1.01 (ref 1–1.03)
UA UROBILINOGEN AMB POC: NORMAL (ref 0.2–1)
URINALYSIS CLARITY POC: NORMAL
URINALYSIS COLOR POC: NORMAL
URINE BLOOD POC: NORMAL
URINE LEUKOCYTES POC: NORMAL
URINE NITRITES POC: NEGATIVE

## 2017-05-22 NOTE — PATIENT INSTRUCTIONS

## 2017-05-22 NOTE — PROGRESS NOTES
Mr. Layne Acharya has a reminder for a \"due or due soon\" health maintenance. I have asked that he contact his primary care provider for follow-up on this health maintenance.

## 2017-05-22 NOTE — MR AVS SNAPSHOT
Visit Information Date & Time Provider Department Dept. Phone Encounter #  
 5/22/2017  3:30 PM Kim Coppola, [facility] [phone] [id number] Upcoming Health Maintenance Date Due DTaP/Tdap/Td series (1 - Tdap) 1/20/1947 ZOSTER VACCINE AGE 60> 1/20/1986 GLAUCOMA SCREENING Q2Y 1/20/1991 Pneumococcal 65+ Low/Medium Risk (1 of 2 - PCV13) 1/20/1991 MEDICARE YEARLY EXAM 1/20/1991 INFLUENZA AGE 9 TO ADULT 8/1/2017 Allergies as of 5/22/2017  Review Complete On: 5/22/2017 By: Chavo Cox LPN Severity Noted Reaction Type Reactions Lyrica [Pregabalin] High 08/26/2014    Swelling Clindamycin Medium 08/26/2014    Other (comments) C. Diff Current Immunizations  Never Reviewed No immunizations on file. Not reviewed this visit You Were Diagnosed With   
  
 Codes Comments Urinary frequency    -  Primary ICD-10-CM: R35.0 ICD-9-CM: 788.41 Vitals Height(growth percentile) Smoking Status 5' 8\" (1.727 m) Former Smoker Preferred Pharmacy Pharmacy Name Phone [facility] [address] [address] Your Updated Medication List  
  
   
This list is accurate as of: 5/22/17  3:35 PM.  Always use your most recent med list.  
  
  
  
  
 cholecalciferol (vitamin D3) 2,000 unit Tab Take  by Mouth. ciprofloxacin HCl 250 mg tablet Commonly known as:  CIPRO Take 1 Tab by mouth every twelve (12) hours for 5 days. docusate sodium 100 mg capsule Commonly known as:  Simon Chihuahua Take 1 Cap by mouth two (2) times a day for 10 days. fluticasone 50 mcg/actuation nasal spray Commonly known as:  FLONASE  
1 [place]. OMEGA-3 FATTY ACIDS-FISH OIL PO  
1,000 mg.  
  
 oxybutynin 5 mg tablet Commonly known as:  UXASRWCL Take 5 mg by mouth three (3) times daily.   
  
 oxyCODONE IR 5 mg immediate release tablet Commonly known as:  Julio nAdersen Take 1 Tab by mouth every four (4) hours as needed for Pain. Max Daily Amount: 30 mg.  
  
 simvastatin 20 mg tablet Commonly known as:  ZOCOR Take  by mouth nightly. tamsulosin 0.4 mg capsule Commonly known as:  FLOMAX Take 0.4 mg by mouth daily. We Performed the Following AMB POC URINALYSIS DIP STICK AUTO W/O MICRO [70007 CPT(R)] Patient Instructions Benign Prostatic Hyperplasia: Care Instructions Your Care Instructions Benign prostatic hyperplasia, or BPH, is an enlarged prostate gland. The prostate is a small gland that makes some of the fluid in semen. Prostate enlargement happens to almost all men as they age. It is usually not serious. BPH does not cause prostate cancer. As the prostate gets bigger, it may partly block the flow of urine. You may have a hard time getting a urine stream started or completely stopped. BPH can cause dribbling. You may have a weak urine stream, or you may have to urinate more often than you used to, especially at night. Most men find these problems easy to manage. You do not need treatment unless your symptoms bother you a lot or you have other problems, such as bladder infections or stones. In these cases, medicines may help. Surgery is not needed unless the urine flow is blocked or the symptoms do not get better with medicine. Follow-up care is a key part of your treatment and safety. Be sure to make and go to all appointments, and call your doctor if you are having problems. It's also a good idea to know your test results and keep a list of the medicines you take. How can you care for yourself at home? · Take plenty of time to urinate. Try to relax. · Try \"double voiding. \" Urinate as much you can, relax for a few moments, and then try to urinate again. · Sit on the toilet to urinate. · Read or think of other things while you are waiting.  
· Turn on a faucet, or try to picture running water. Some men find that this helps get their urine flowing. · If dribbling is a problem, wash your penis daily to avoid skin irritation and infection. · Avoid caffeine and alcohol. These drinks will increase how often you need to urinate. Spread your fluid intake throughout the day. If the urge to urinate often wakes you at night, limit your fluid intake in the evening. Urinate right before you go to bed. · Many over-the-counter cold and allergy medicines can make the symptoms of BPH worse. Avoid antihistamines, decongestants, and allergy pills, if you can. Read the warnings on the package. · If you take any prescription medicines, especially tranquilizers or antidepressants, ask your doctor or pharmacist whether they can cause urination problems. There may be other medicines you can use that do not cause urinary problems. · Be safe with medicines. Take your medicines exactly as prescribed. Call your doctor if you think you are having a problem with your medicine. When should you call for help? Call your doctor now or seek immediate medical care if: 
· You cannot urinate at all. · You have symptoms of a urinary infection. For example: ¨ You have blood or pus in your urine. ¨ You have pain in your back just below your rib cage. This is called flank pain. ¨ You have a fever, chills, or body aches. ¨ It hurts to urinate. ¨ You have groin or belly pain. Watch closely for changes in your health, and be sure to contact your doctor if: 
· It hurts when you ejaculate. · Your urinary problems get a lot worse or bother you a lot. Where can you learn more? Go to http://tu-angela.info/. Enter W285 in the search box to learn more about \"Benign Prostatic Hyperplasia: Care Instructions. \" Current as of: May 24, 2016 Content Version: 11.2 © 8771-9841 [employer].  Care instructions adapted under license by [employer] (which disclaims liability or warranty for this information). If you have questions about a medical condition or this instruction, always ask your healthcare professional. [address] any warranty or liability for your use of this information. Please provide this summary of care documentation to your next provider. Your primary care clinician is listed as ELFEGO NAPOLES. If you have any questions after today's visit, please call [phone].

## 2017-05-23 NOTE — PROGRESS NOTES
Vu Salinas 80 y.o. male     Mr. Marquez Bob seen today for voiding trial status post TURP for urinary retention 3 days ago  Block catheter was removed 7 hours prior to presentation to the office today-patient has voided several times with a good stream since removing Block catheter    irritative voiding symptoms-patient complains of nocturia 5-6 times per night associated with severe and sudden urgency episodes during the daytime during the past several months-patient has been treated with alpha-blocker and anticholinergic medication for relief of irritable bladder symptoms caused by BPH under the care of a urologist in [place] but managed by primary physician in [place] for the past 5 years with Ditropan and Flomax      Urinary stream is weak and intermittent-no dysuria-no fever flank pain or pain in the perineum-  No history of  tract disease, trauma, or surgery      PVR 5 71 cc on for May 2017      PSA 10.0 on 4 May 2017          Review of Systems:   CNS: No seizures syncope headaches dizziness or visual changes  Respiratory: No wheezing no shortness of breath no coughing-  Cardiovascular: No chest pains no palpitations  Intestinal: No dyspepsia diarrhea or constipation  Urinary: Irritable bladder symptoms for several years  Skeletal: Large joint arthritis  Endocrine: No diabetes or thyroid disease other:  Allergies: Allergies   Allergen Reactions    Lyrica [Pregabalin] Swelling    Clindamycin Other (comments)     C. Diff      Medications:    Current Outpatient Prescriptions   Medication Sig Dispense Refill    ciprofloxacin HCl (CIPRO) 250 mg tablet Take 1 Tab by mouth every twelve (12) hours for 5 days. 10 Tab 0    tamsulosin (FLOMAX) 0.4 mg capsule Take 0.4 mg by mouth daily.  simvastatin (ZOCOR) 20 mg tablet Take  by mouth nightly.  cholecalciferol, vitamin D3, 2,000 unit tab Take  by Mouth.       OMEGA-3 FATTY ACIDS-FISH OIL PO 1,000 mg.      docusate sodium (COLACE) 100 mg capsule Take 1 Cap by mouth two (2) times a day for 10 days. 20 Cap 0    oxyCODONE IR (ROXICODONE) 5 mg immediate release tablet Take 1 Tab by mouth every four (4) hours as needed for Pain. Max Daily Amount: 30 mg. 20 Tab 0    oxybutynin (DITROPAN) 5 mg tablet Take 5 mg by mouth three (3) times daily.  fluticasone (FLONASE) 50 mcg/actuation nasal spray 1 Spray. Past Medical History:   Diagnosis Date    BPH (benign prostatic hyperplasia)       Past Surgical History:   Procedure Laterality Date    HX CATARACT REMOVAL Bilateral 12/2016    dec/2016-left eye, jan/2017-right eye    HX CYST REMOVAL  1947    HX MOHS PROCEDURES  1980s    left ear     Family History   Problem Relation Age of Onset    Diabetes Father     Diabetes Brother         Physical Examination: Well-nourished mature male in no apparent distress      Urinalysis: Nitrite negative/+++heme    PVR today 18 cc    Impression: Stable status post TURP        Plan: Maintain light activity           rtc 1 week-PVR and pathology report        Lazarus Mahan MD  -electronically signed-    PLEASE NOTE:  This document has been produced using voice recognition software. Unrecognized errors in transcription may be present.

## 2017-05-31 ENCOUNTER — OFFICE VISIT (OUTPATIENT)
Dept: UROLOGY | Age: 82
End: 2017-05-31

## 2017-05-31 VITALS
BODY MASS INDEX: 24.71 KG/M2 | HEART RATE: 69 BPM | TEMPERATURE: 98.2 F | DIASTOLIC BLOOD PRESSURE: 68 MMHG | SYSTOLIC BLOOD PRESSURE: 154 MMHG | WEIGHT: 163 LBS | HEIGHT: 68 IN | OXYGEN SATURATION: 97 %

## 2017-05-31 DIAGNOSIS — N40.0 BENIGN PROSTATIC HYPERPLASIA, PRESENCE OF LOWER URINARY TRACT SYMPTOMS UNSPECIFIED, UNSPECIFIED MORPHOLOGY: Primary | ICD-10-CM

## 2017-05-31 LAB
BILIRUB UR QL STRIP: NEGATIVE
GLUCOSE UR-MCNC: NEGATIVE MG/DL
KETONES P FAST UR STRIP-MCNC: NEGATIVE MG/DL
PH UR STRIP: 6 [PH] (ref 4.6–8)
PROT UR QL STRIP: NORMAL MG/DL
SP GR UR STRIP: 1.01 (ref 1–1.03)
UA UROBILINOGEN AMB POC: NORMAL (ref 0.2–1)
URINALYSIS CLARITY POC: NORMAL
URINALYSIS COLOR POC: NORMAL
URINE BLOOD POC: NORMAL
URINE LEUKOCYTES POC: NORMAL
URINE NITRITES POC: NEGATIVE

## 2017-05-31 NOTE — MR AVS SNAPSHOT
Visit Information Date & Time Provider Department Dept. Phone Encounter #  
 5/31/2017  2:00 PM Caesar Fletcher, [address]  [facility] [id number] Your Appointments 6/28/2017  2:30 PM  
ULTRASOUND with MD FOUZIA Howard [facility] [address] [facility]) Appt Note: PVR  
 [address] Steve [address] [address]  
[phone] [address] [facility] [address] Upcoming Health Maintenance Date Due DTaP/Tdap/Td series (1 - Tdap) 1/20/1947 ZOSTER VACCINE AGE 60> 1/20/1986 GLAUCOMA SCREENING Q2Y 1/20/1991 Pneumococcal 65+ Low/Medium Risk (1 of 2 - PCV13) 1/20/1991 MEDICARE YEARLY EXAM 1/20/1991 INFLUENZA AGE 9 TO ADULT 8/1/2017 Allergies as of 5/31/2017  Review Complete On: 5/31/2017 By: Prashant Aguirre LPN Severity Noted Reaction Type Reactions Lyrica [Pregabalin] High 08/26/2014    Swelling Clindamycin Medium 08/26/2014    Other (comments) C. Diff Current Immunizations  Never Reviewed No immunizations on file. Not reviewed this visit You Were Diagnosed With   
  
 Codes Comments Benign prostatic hyperplasia, presence of lower urinary tract symptoms unspecified, unspecified morphology    -  Primary ICD-10-CM: N40.0 ICD-9-CM: 600.00 Vitals BP Pulse Temp Height(growth percentile) Weight(growth percentile) SpO2  
 154/68 (BP 1 Location: Left arm, BP Patient Position: Sitting) 69 98.2 °F (36.8 °C) 5' 8\" (1.727 m) 163 lb (73.9 kg) 97% BMI Smoking Status 24.78 kg/m2 Former Smoker Vitals History BMI and BSA Data Body Mass Index Body Surface Area 24.78 kg/m 2 1.88 m 2 Preferred Pharmacy Pharmacy Name Phone "[facility] [address] [place], [address] Your Updated Medication List  
  
   
This list is accurate as of: 5/31/17  3:01 PM. Always use your most recent med list.  
  
  
  
  
 cholecalciferol (vitamin D3) 2,000 unit Tab Take  by Mouth. fluticasone 50 mcg/actuation nasal spray Commonly known as:  FLONASE  
1 [place]. OMEGA-3 FATTY ACIDS-FISH OIL PO  
1,000 mg.  
  
 oxybutynin 5 mg tablet Commonly known as:  LOTWANPF Take 5 mg by mouth three (3) times daily. oxyCODONE IR 5 mg immediate release tablet Commonly known as:  Jerry Stokes Take 1 Tab by mouth every four (4) hours as needed for Pain. Max Daily Amount: 30 mg.  
  
 simvastatin 20 mg tablet Commonly known as:  ZOCOR Take  by mouth nightly. tamsulosin 0.4 mg capsule Commonly known as:  FLOMAX Take 0.4 mg by mouth daily. We Performed the Following AMB POC URINALYSIS DIP STICK AUTO W/O MICRO [76259 CPT(R)] Patient Instructions Benign Prostatic Hyperplasia: Care Instructions Your Care Instructions Benign prostatic hyperplasia, or BPH, is an enlarged prostate gland. The prostate is a small gland that makes some of the fluid in semen. Prostate enlargement happens to almost all men as they age. It is usually not serious. BPH does not cause prostate cancer. As the prostate gets bigger, it may partly block the flow of urine. You may have a hard time getting a urine stream started or completely stopped. BPH can cause dribbling. You may have a weak urine stream, or you may have to urinate more often than you used to, especially at night. Most men find these problems easy to manage. You do not need treatment unless your symptoms bother you a lot or you have other problems, such as bladder infections or stones. In these cases, medicines may help. Surgery is not needed unless the urine flow is blocked or the symptoms do not get better with medicine. Follow-up care is a key part of your treatment and safety. Be sure to make and go to all appointments, and call your doctor if you are having problems.  It's also a good idea to know your test results and keep a list of the medicines you take. How can you care for yourself at home? · Take plenty of time to urinate. Try to relax. · Try \"double voiding. \" Urinate as much you can, relax for a few moments, and then try to urinate again. · Sit on the toilet to urinate. · Read or think of other things while you are waiting. · Turn on a faucet, or try to picture running water. Some men find that this helps get their urine flowing. · If dribbling is a problem, wash your penis daily to avoid skin irritation and infection. · Avoid caffeine and alcohol. These drinks will increase how often you need to urinate. Spread your fluid intake throughout the day. If the urge to urinate often wakes you at night, limit your fluid intake in the evening. Urinate right before you go to bed. · Many over-the-counter cold and allergy medicines can make the symptoms of BPH worse. Avoid antihistamines, decongestants, and allergy pills, if you can. Read the warnings on the package. · If you take any prescription medicines, especially tranquilizers or antidepressants, ask your doctor or pharmacist whether they can cause urination problems. There may be other medicines you can use that do not cause urinary problems. · Be safe with medicines. Take your medicines exactly as prescribed. Call your doctor if you think you are having a problem with your medicine. When should you call for help? Call your doctor now or seek immediate medical care if: 
· You cannot urinate at all. · You have symptoms of a urinary infection. For example: ¨ You have blood or pus in your urine. ¨ You have pain in your back just below your rib cage. This is called flank pain. ¨ You have a fever, chills, or body aches. ¨ It hurts to urinate. ¨ You have groin or belly pain. Watch closely for changes in your health, and be sure to contact your doctor if: 
· It hurts when you ejaculate.  
· Your urinary problems get a lot worse or bother you a lot. Where can you learn more? Go to http://tu-angela.info/. Enter I692 in the search box to learn more about \"Benign Prostatic Hyperplasia: Care Instructions. \" Current as of: May 24, 2016 Content Version: 11.2 © 6074-6873 [employer]. Care instructions adapted under license by [employer] (which disclaims liability or warranty for this information). If you have questions about a medical condition or this instruction, always ask your healthcare professional. [address] any warranty or liability for your use of this information. Please provide this summary of care documentation to your next provider. Your primary care clinician is listed as ELFEGO NAPOLES. If you have any questions after today's visit, please call [phone].

## 2017-05-31 NOTE — PROGRESS NOTES
Mr. Grace Anton has a reminder for a \"due or due soon\" health maintenance. I have asked that he contact his primary care provider for follow-up on this health maintenance.

## 2017-05-31 NOTE — PATIENT INSTRUCTIONS

## 2017-06-01 NOTE — PROGRESS NOTES
Kate Ibarra 80 y.o. male     Mr. Daisy Perez seen today for follow-up obstructive prostatism with urinary retention status post TURP 10 days ago    Patient is now voiding with a solid stream good control irritative symptoms are same frequency but less intense/only complaint at this time is split urinary stream    TURP pathology 19 May 2017 MS 17-2/9/2005 19 g of tissue benign histology    irritative voiding symptoms-patient complains of nocturia 5-6 times per night associated with severe and sudden urgency episodes during the daytime during the past several months-patient has been treated with alpha-blocker and anticholinergic medication for relief of irritable bladder symptoms caused by BPH under the care of a urologist in [place] but managed by primary physician in [place] for the past 5 years with Ditropan and Flomax      Urinary stream is weak and intermittent-no dysuria-no fever flank pain or pain in the perineum-  No history of  tract disease, trauma, or surgery      PVR 5 71 cc on for May 2017      PSA 10.0 on 4 May 2017          Review of Systems:   CNS: No seizures syncope headaches dizziness or visual changes  Respiratory: No wheezing no shortness of breath no coughing-  Cardiovascular: No chest pains no palpitations  Intestinal: No dyspepsia diarrhea or constipation  Urinary: Irritable bladder symptoms for several years  Skeletal: Large joint arthritis  Endocrine: No diabetes or thyroid disease   other:      Allergies: Allergies   Allergen Reactions    Lyrica [Pregabalin] Swelling    Clindamycin Other (comments)     C. Diff      Medications:    Current Outpatient Prescriptions   Medication Sig Dispense Refill    tamsulosin (FLOMAX) 0.4 mg capsule Take 0.4 mg by mouth daily.  simvastatin (ZOCOR) 20 mg tablet Take  by mouth nightly.  fluticasone (FLONASE) 50 mcg/actuation nasal spray 1 Spray.  cholecalciferol, vitamin D3, 2,000 unit tab Take  by Mouth.       OMEGA-3 FATTY ACIDS-FISH OIL PO 1,000 mg.      oxyCODONE IR (ROXICODONE) 5 mg immediate release tablet Take 1 Tab by mouth every four (4) hours as needed for Pain. Max Daily Amount: 30 mg. 20 Tab 0    oxybutynin (DITROPAN) 5 mg tablet Take 5 mg by mouth three (3) times daily. Past Medical History:   Diagnosis Date    BPH (benign prostatic hyperplasia)       Past Surgical History:   Procedure Laterality Date    HX CATARACT REMOVAL Bilateral 12/2016    dec/2016-left eye, jan/2017-right eye    HX CYST REMOVAL  1947    HX MOHS PROCEDURES  1980s    left ear     Family History   Problem Relation Age of Onset    Diabetes Father     Diabetes Brother         Physical Examination: Well-nourished mature male in no apparent distress      Urinalysis: +++heme/negative nitrite    PVR today 3 67 cc    Impression: Symptomatic BPH with obstructive prostatism and urinary retention responding favorably to TURP on 19 May 2017-now voiding spontaneously    Plan: Discussed pathology report from TURP today    Maintain light activity    rtc 4 weeks PVR        Steff Constantino MD  -electronically signed-    PLEASE NOTE:  This document has been produced using voice recognition software. Unrecognized errors in transcription may be present.

## 2017-06-14 ENCOUNTER — OFFICE VISIT (OUTPATIENT)
Dept: UROLOGY | Age: 82
End: 2017-06-14

## 2017-06-14 VITALS
HEART RATE: 73 BPM | DIASTOLIC BLOOD PRESSURE: 79 MMHG | SYSTOLIC BLOOD PRESSURE: 145 MMHG | WEIGHT: 163 LBS | OXYGEN SATURATION: 98 % | TEMPERATURE: 98 F | BODY MASS INDEX: 24.71 KG/M2 | HEIGHT: 68 IN

## 2017-06-14 DIAGNOSIS — R97.20 BPH WITH ELEVATED PSA: Primary | ICD-10-CM

## 2017-06-14 DIAGNOSIS — N40.0 BPH WITH ELEVATED PSA: Primary | ICD-10-CM

## 2017-06-14 LAB
BILIRUB UR QL STRIP: NEGATIVE
GLUCOSE UR-MCNC: NEGATIVE MG/DL
KETONES P FAST UR STRIP-MCNC: NEGATIVE MG/DL
PH UR STRIP: 6 [PH] (ref 4.6–8)
PROT UR QL STRIP: NORMAL MG/DL
SP GR UR STRIP: 1.01 (ref 1–1.03)
UA UROBILINOGEN AMB POC: NORMAL (ref 0.2–1)
URINALYSIS CLARITY POC: CLEAR
URINALYSIS COLOR POC: YELLOW
URINE BLOOD POC: NORMAL
URINE LEUKOCYTES POC: NORMAL
URINE NITRITES POC: NEGATIVE

## 2017-06-14 NOTE — PATIENT INSTRUCTIONS

## 2017-06-14 NOTE — MR AVS SNAPSHOT
Visit Information Date & Time Provider Department Dept. Phone Encounter #  
 6/14/2017  3:30 PM Mary [facility] [phone] [id number] Your Appointments 6/28/2017  2:30 PM  
ULTRASOUND with Nathalie Pena MD  
[facility]) Appt Note: PVR  
 [address] [address]  
[phone] [address] [address] Upcoming Health Maintenance Date Due DTaP/Tdap/Td series (1 - Tdap) 1/20/1947 ZOSTER VACCINE AGE 60> 1/20/1986 GLAUCOMA SCREENING Q2Y 1/20/1991 Pneumococcal 65+ Low/Medium Risk (1 of 2 - PCV13) 1/20/1991 MEDICARE YEARLY EXAM 1/20/1991 INFLUENZA AGE 9 TO ADULT 8/1/2017 Allergies as of 6/14/2017  Review Complete On: 6/14/2017 By: Otilia Mcintyre Severity Noted Reaction Type Reactions Lyrica [Pregabalin] High 08/26/2014    Swelling Clindamycin Medium 08/26/2014    Other (comments) C. Diff Current Immunizations  Never Reviewed No immunizations on file. Not reviewed this visit Vitals BP Pulse Temp Height(growth percentile) Weight(growth percentile) SpO2  
 145/79 (BP 1 Location: Left arm, BP Patient Position: Sitting) 73 98 °F (36.7 °C) (Oral) 5' 8\" (1.727 m) 163 lb (73.9 kg) 98% BMI Smoking Status 24.78 kg/m2 Former Smoker Vitals History BMI and BSA Data Body Mass Index Body Surface Area 24.78 kg/m 2 1.88 m 2 Preferred Pharmacy Pharmacy Name Phone [facility] [address] Your Updated Medication List  
  
   
This list is accurate as of: 6/14/17  3:54 PM.  Always use your most recent med list.  
  
  
  
  
 cholecalciferol (vitamin D3) 2,000 unit Tab Take  by Mouth. fluticasone 50 mcg/actuation nasal spray Commonly known as:  FLONASE  
1 Spray.   
  
 OMEGA-3 FATTY ACIDS-FISH OIL PO  
1,000 mg.  
  
 oxybutynin 5 mg tablet Commonly known as:  JSQGAVMY Take 5 mg by mouth three (3) times daily. oxyCODONE IR 5 mg immediate release tablet Commonly known as:  Clista Mate Take 1 Tab by mouth every four (4) hours as needed for Pain. Max Daily Amount: 30 mg.  
  
 simvastatin 20 mg tablet Commonly known as:  ZOCOR Take  by mouth nightly. tamsulosin 0.4 mg capsule Commonly known as:  FLOMAX Take 0.4 mg by mouth daily. Patient Instructions Benign Prostatic Hyperplasia: Care Instructions Your Care Instructions Benign prostatic hyperplasia, or BPH, is an enlarged prostate gland. The prostate is a small gland that makes some of the fluid in semen. Prostate enlargement happens to almost all men as they age. It is usually not serious. BPH does not cause prostate cancer. As the prostate gets bigger, it may partly block the flow of urine. You may have a hard time getting a urine stream started or completely stopped. BPH can cause dribbling. You may have a weak urine stream, or you may have to urinate more often than you used to, especially at night. Most men find these problems easy to manage. You do not need treatment unless your symptoms bother you a lot or you have other problems, such as bladder infections or stones. In these cases, medicines may help. Surgery is not needed unless the urine flow is blocked or the symptoms do not get better with medicine. Follow-up care is a key part of your treatment and safety. Be sure to make and go to all appointments, and call your doctor if you are having problems. It's also a good idea to know your test results and keep a list of the medicines you take. How can you care for yourself at home? · Take plenty of time to urinate. Try to relax. · Try \"double voiding. \" Urinate as much you can, relax for a few moments, and then try to urinate again. · Sit on the toilet to urinate.  
· Read or think of other things while you are waiting. · Turn on a faucet, or try to picture running water. Some men find that this helps get their urine flowing. · If dribbling is a problem, wash your penis daily to avoid skin irritation and infection. · Avoid caffeine and alcohol. These drinks will increase how often you need to urinate. Spread your fluid intake throughout the day. If the urge to urinate often wakes you at night, limit your fluid intake in the evening. Urinate right before you go to bed. · Many over-the-counter cold and allergy medicines can make the symptoms of BPH worse. Avoid antihistamines, decongestants, and allergy pills, if you can. Read the warnings on the package. · If you take any prescription medicines, especially tranquilizers or antidepressants, ask your doctor or pharmacist whether they can cause urination problems. There may be other medicines you can use that do not cause urinary problems. · Be safe with medicines. Take your medicines exactly as prescribed. Call your doctor if you think you are having a problem with your medicine. When should you call for help? Call your doctor now or seek immediate medical care if: 
· You cannot urinate at all. · You have symptoms of a urinary infection. For example: ¨ You have blood or pus in your urine. ¨ You have pain in your back just below your rib cage. This is called flank pain. ¨ You have a fever, chills, or body aches. ¨ It hurts to urinate. ¨ You have groin or belly pain. Watch closely for changes in your health, and be sure to contact your doctor if: 
· It hurts when you ejaculate. · Your urinary problems get a lot worse or bother you a lot. Where can you learn more? Go to http://tu-angela.info/. Enter I915 in the search box to learn more about \"Benign Prostatic Hyperplasia: Care Instructions. \" Current as of: May 24, 2016 Content Version: 11.2 © 0668-5071 [employer].  Care instructions adapted under license by [employer] (which disclaims liability or warranty for this information). If you have questions about a medical condition or this instruction, always ask your healthcare professional. [address] any warranty or liability for your use of this information. Please provide this summary of care documentation to your next provider. Your primary care clinician is listed as ELFEGO NAPOLES. If you have any questions after today's visit, please call [phone].

## 2017-06-15 NOTE — PROGRESS NOTES
Pal Painter 80 y.o. male     Mr. Jag Nayak seen today for symptomatic BPH follow-up with urinary retention secondary to obstructive prostatism status post TURP in April 2017  Here today for PVR assessment of voiding efficiency-patient is very happy with the results of TURP   patient is now voiding with a solid stream good control irritative symptoms are same frequency but less intense/only complaint at this time is split urinary stream     TURP pathology 19 May 2017 MS 17-2/9/2005 19 g of tissue benign histology     irritative voiding symptoms-patient complains of nocturia 5-6 times per night associated with severe and sudden urgency episodes during the daytime during the past several months-patient has been treated with alpha-blocker and anticholinergic medication for relief of irritable bladder symptoms caused by BPH under the care of a urologist in [place] but managed by primary physician in [place] for the past 5 years with Ditropan and Flomax      Urinary stream is weak and intermittent-no dysuria-no fever flank pain or pain in the perineum-  No history of  tract disease, trauma, or surgery      PVR 5 71 cc on for May 2017      PSA 10.0 on 4 May 2017          Review of Systems:   CNS: No seizures syncope headaches dizziness or visual changes  Respiratory: No wheezing no shortness of breath no coughing-  Cardiovascular: No chest pains no palpitations  Intestinal: No dyspepsia diarrhea or constipation  Urinary: Irritable bladder symptoms for several years  Skeletal: Large joint arthritis  Endocrine: No diabetes or thyroid disease   other:     Allergies: Allergies   Allergen Reactions    Lyrica [Pregabalin] Swelling    Clindamycin Other (comments)     C. Diff      Medications:    Current Outpatient Prescriptions   Medication Sig Dispense Refill    tamsulosin (FLOMAX) 0.4 mg capsule Take 0.4 mg by mouth daily.  simvastatin (ZOCOR) 20 mg tablet Take  by mouth nightly.       fluticasone (FLONASE) 50 mcg/actuation nasal spray 1 Spray.  cholecalciferol, vitamin D3, 2,000 unit tab Take  by Mouth.  OMEGA-3 FATTY ACIDS-FISH OIL PO 1,000 mg.      oxyCODONE IR (ROXICODONE) 5 mg immediate release tablet Take 1 Tab by mouth every four (4) hours as needed for Pain. Max Daily Amount: 30 mg. 20 Tab 0    oxybutynin (DITROPAN) 5 mg tablet Take 5 mg by mouth three (3) times daily. Past Medical History:   Diagnosis Date    BPH (benign prostatic hyperplasia)       Past Surgical History:   Procedure Laterality Date    HX CATARACT REMOVAL Bilateral 12/2016    dec/2016-left eye, jan/2017-right eye    HX CYST REMOVAL  1947    HX MOHS PROCEDURES  1980s    left ear     Family History   Problem Relation Age of Onset    Diabetes Father     Diabetes Brother         Physical Examination: Well-nourished mature male in no apparent distress    PVR today 210 cc      Impression: Symptomatic BPH progressing to jt retention resolved by TURP      Plan: Continue Flomax 0.4 mg daily    rtc 6 mo       Agueda Romo MD  -electronically signed-    PLEASE NOTE:  This document has been produced using voice recognition software. Unrecognized errors in transcription may be present.

## 2017-06-19 RX ORDER — TAMSULOSIN HYDROCHLORIDE 0.4 MG/1
0.4 CAPSULE ORAL DAILY
Qty: 90 CAP | Refills: 3 | Status: SHIPPED | OUTPATIENT
Start: 2017-06-19 | End: 2018-05-22 | Stop reason: SDUPTHER

## 2017-07-08 LAB — LDL-C, EXTERNAL: 101

## 2017-08-11 ENCOUNTER — OFFICE VISIT (OUTPATIENT)
Dept: CARDIOLOGY CLINIC | Age: 82
End: 2017-08-11

## 2017-08-11 VITALS
SYSTOLIC BLOOD PRESSURE: 133 MMHG | BODY MASS INDEX: 24.71 KG/M2 | DIASTOLIC BLOOD PRESSURE: 53 MMHG | HEART RATE: 59 BPM | WEIGHT: 163 LBS | HEIGHT: 68 IN

## 2017-08-11 DIAGNOSIS — R55 VASOVAGAL EPISODE: Primary | ICD-10-CM

## 2017-08-11 DIAGNOSIS — I49.5 SSS (SICK SINUS SYNDROME) (HCC): ICD-10-CM

## 2017-08-11 DIAGNOSIS — E78.5 HYPERLIPIDEMIA, UNSPECIFIED HYPERLIPIDEMIA TYPE: ICD-10-CM

## 2017-08-11 DIAGNOSIS — I50.32 CHRONIC DIASTOLIC CONGESTIVE HEART FAILURE (HCC): ICD-10-CM

## 2017-08-11 NOTE — LETTER
Rashmi Donovan 
1/20/1926 8/11/2017 Dear Clayborne Hastings, MD Flavio Gowers, MD 
 
I had the pleasure of evaluating  Mr. Adrian Horne in office today. Below are the relevant portions of my assessment and plan of care. ICD-10-CM ICD-9-CM 1. Vasovagal episode R55 780.2 7/17 likely related to poss mild dehydration, post op state 2. Chronic diastolic congestive heart failure (HCC) I50.32 428.32   
  428.0   
 8/17 mild with edema-trace and +HJR 
avoiding diuresis as no SOB and prone to vagal episodes 3. SSS (sick sinus syndrome) ([facility]) I49.5 427.81   
 7/17 zio patch: 1 x 13 sec mobitz 2 and rare short PATs- no Rx needed for now 4. Hyperlipidemia, unspecified hyperlipidemia type E78.5 272.4 f/u PCP Current Outpatient Prescriptions Medication Sig Dispense Refill  tamsulosin (FLOMAX) 0.4 mg capsule Take 1 Cap by mouth daily. 90 Cap 3  
 simvastatin (ZOCOR) 20 mg tablet Take  by mouth nightly.  fluticasone (FLONASE) 50 mcg/actuation nasal spray 1 Spray.  cholecalciferol, vitamin D3, 2,000 unit tab Take  by Mouth.  OMEGA-3 FATTY ACIDS-FISH OIL PO 1,000 mg. No orders of the defined types were placed in this encounter. If you have questions, please do not hesitate to call me. I look forward to following Mr. Adrian Horne along with you. Sincerely, Chapin Armando MD

## 2017-08-11 NOTE — PATIENT INSTRUCTIONS
Medications Discontinued During This Encounter   Medication Reason    oxybutynin (DITROPAN) 5 mg tablet Therapy Completed    oxyCODONE IR (ROXICODONE) 5 mg immediate release tablet Therapy Completed       No orders of the defined types were placed in this encounter. Vasovagal Syncope: Care Instructions  Your Care Instructions    Vasovagal syncope (say \"osv-erx-ENX-tolu VELAEBCZ-zen-mba\") is sudden dizziness or fainting that can be set off by things such as pain, stress, fear, or trauma. You may sweat or feel lightheaded, sick to your stomach, or tingly. The problem causes the heart rate to slow and the blood vessels to widen, or dilate, for a short time. When this happens, blood pools in the lower body, and less blood goes to the brain. You can usually get relief by lying down with your legs raised (elevated). This helps more blood to flow to your brain and may help relieve symptoms like feeling dizzy. Some doctors may recommend a technique that involves tensing your fists and arms. This type of fainting is often easy to predict. For example, it happens to some people when they see blood or have to get a shot. They may feel symptoms before they faint. An episode of vasovagal syncope usually responds well to self-care. Other treatment often isn't needed. But if the fainting keeps happening, your doctor may suggest further treatments. Follow-up care is a key part of your treatment and safety. Be sure to make and go to all appointments, and call your doctor if you are having problems. It's also a good idea to know your test results and keep a list of the medicines you take. How can you care for yourself at home? · Drink plenty of fluids to prevent dehydration. If you have kidney, heart, or liver disease and have to limit fluids, talk with your doctor before you increase your fluid intake. · Try to avoid things that you think may set off vasovagal syncope.   · Talk to your doctor about any medicines you take. Some medicines may increase the chance of this condition occurring. · If you feel symptoms, lie down with your legs raised. Talk to your doctor about what to do if your symptoms come back. When should you call for help? Call 911 anytime you think you may need emergency care. For example, call if:  · You have symptoms of a heart problem. These may include:  ¨ Chest pain or pressure. ¨ Severe trouble breathing. ¨ A fast or irregular heartbeat. Watch closely for changes in your health, and be sure to contact your doctor if:  · You have more episodes of fainting at home. · You do not get better as expected. Where can you learn more? Go to http://tu-angela.info/. Enter L754 in the search box to learn more about \"Vasovagal Syncope: Care Instructions. \"  Current as of: March 20, 2017  Content Version: 11.3  © 4956-3446 [employer]. Care instructions adapted under license by [employer] (which disclaims liability or warranty for this information). If you have questions about a medical condition or this instruction, always ask your healthcare professional. [place] [address] any warranty or liability for your use of this information.

## 2017-08-11 NOTE — PROGRESS NOTES
HISTORY OF PRESENT ILLNESS  Tonya Solis is a 80 y.o. male. Dizziness   The history is provided by the patient. This is a new problem. The current episode started more than 1 week ago (7/17 felt dizzy- 2 episodes, improved with rest, HR in 40s at home that day, off & on Sx for 3-4 days). The problem occurs every several days. Pertinent negatives include no chest pain, no headaches and no shortness of breath. The symptoms are aggravated by bending. The symptoms are relieved by rest. He has tried nothing for the symptoms. Review of Systems   Constitutional: Negative for chills, fever, malaise/fatigue and weight loss. HENT: Negative for nosebleeds. Eyes: Negative for discharge. Respiratory: Negative for cough, shortness of breath and wheezing. Cardiovascular: Negative for chest pain, palpitations, orthopnea, claudication, leg swelling and PND. Gastrointestinal: Negative for diarrhea, nausea and vomiting. Genitourinary: Negative for dysuria and hematuria. Musculoskeletal: Negative for joint pain. Skin: Negative for rash. Neurological: Positive for dizziness. Negative for seizures, loss of consciousness and headaches. Endo/Heme/Allergies: Negative for polydipsia. Does not bruise/bleed easily. Psychiatric/Behavioral: Negative for depression and substance abuse. The patient does not have insomnia. Allergies   Allergen Reactions    Lyrica [Pregabalin] Swelling    Clindamycin Other (comments)     C.  Diff       Past Medical History:   Diagnosis Date    BPH (benign prostatic hyperplasia)     History of shingles 2007    rt arm/neck    Hyperlipidemia        Family History   Problem Relation Age of Onset    Diabetes Father     Diabetes Brother     Heart Attack Neg Hx     Stroke Neg Hx        Social History   Substance Use Topics    Smoking status: Former Smoker     Quit date: 1/1/1960    Smokeless tobacco: Never Used    Alcohol use No        Current Outpatient Prescriptions Medication Sig    tamsulosin (FLOMAX) 0.4 mg capsule Take 1 Cap by mouth daily.  simvastatin (ZOCOR) 20 mg tablet Take  by mouth nightly.  fluticasone (FLONASE) 50 mcg/actuation nasal spray 1 Spray.  cholecalciferol, vitamin D3, 2,000 unit tab Take  by Mouth.  OMEGA-3 FATTY ACIDS-FISH OIL PO 1,000 mg. No current facility-administered medications for this visit. Past Surgical History:   Procedure Laterality Date    HX CATARACT REMOVAL Bilateral 12/2016    dec/2016-left eye, jan/2017-right eye    HX CYST REMOVAL  1947    HX MOHS PROCEDURES  1980s    left ear    HX TURP  07/2017    dizzy episodes post op 2-3 wks       Visit Vitals    /53    Pulse (!) 59    Ht 5' 8\" (1.727 m)    Wt 73.9 kg (163 lb)    BMI 24.78 kg/m2       Diagnostic Studies:  I have reviewed the relevant tests done on the patient and show as follows  EKG tracings reviewed by me today. No flowsheet data found. Mr. Emily Franklin has a reminder for a \"due or due soon\" health maintenance. I have asked that he contact his primary care provider for follow-up on this health maintenance. 7/17 Zio Patch  SR, rare PVCs, rare PACs, 1 episode of Mobitz2 x 13 secs at 230 am, rare short PAT      7/17 ECHO  ECHO CARDIOGRAM [address] [facility] [address]  Component Name Value Ref Range   EF Echo 60     Result Impression   :   1. NORMAL LEFT VENTRICULAR SYSTOLIC FUNCTION AND CAVITY SIZE.  LEFT VENTRICULAR EJECTION   FRACTION IS 60%.  MILD LEFT VENTRICULAR DIASTOLIC DYSFUNCTION. 2. NORMAL RIGHT VENTRICULAR SYSTOLIC FUNCTION AND CAVITY SIZE. 3. NO HEMODYNAMICALLY SIGNIFICANT VALVULAR PATHOLOGY. 4. UNABLE TO ESTIMATE SYSTOLIC PULMONARY ARTERY PRESSURE. NO PREVIOUS REPORT FOR COMPARISON. Physical Exam   Constitutional: He is oriented to person, place, and time. He appears well-developed and well-nourished. No distress. HENT:   Head: Normocephalic and atraumatic. Mouth/Throat: Normal dentition.    Eyes: Right eye exhibits no discharge. Left eye exhibits no discharge. No scleral icterus. Neck: Neck supple. Hepatojugular reflux present. No JVD present. Carotid bruit is not present. No thyromegaly present. Cardiovascular: Normal rate, regular rhythm, S1 normal, S2 normal, normal heart sounds and intact distal pulses. Exam reveals no gallop and no friction rub. No murmur heard. Pulmonary/Chest: Effort normal and breath sounds normal. He has no wheezes. He has no rales. Abdominal: Soft. He exhibits no mass. There is no tenderness. Musculoskeletal: He exhibits edema (trace). Lymphadenopathy:        Right cervical: No superficial cervical adenopathy present. Left cervical: No superficial cervical adenopathy present. Neurological: He is alert and oriented to person, place, and time. Skin: Skin is warm and dry. No rash noted. Psychiatric: He has a normal mood and affect. His behavior is normal.       ASSESSMENT and PLAN      Diagnoses and all orders for this visit:    1. Vasovagal episode  Comments:  7/17 likely related to poss mild dehydration, post op state    2. Chronic diastolic congestive heart failure ([address]  Comments:  8/17 mild with edema-trace and +HJR  avoiding diuresis as no SOB and prone to vagal episodes    3. SSS (sick sinus syndrome) (HCC)  Comments:  7/17 zio patch: 1 x 13 sec shine 2 and rare short PATs- no Rx needed for now    4. Hyperlipidemia, unspecified hyperlipidemia type  Comments:  f/u PCP        Pertinent laboratory and test data reviewed and discussed with patient. See patient instructions also for other medical advice given    Medications Discontinued During This Encounter   Medication Reason    oxybutynin (DITROPAN) 5 mg tablet Therapy Completed    oxyCODONE IR (ROXICODONE) 5 mg immediate release tablet Therapy Completed       Follow-up Disposition:  Return in about 6 months (around 2/11/2018), or if symptoms worsen or fail to improve, for with ekg.

## 2017-08-11 NOTE — MR AVS SNAPSHOT
Visit Information Date & Time Provider Department Dept. Phone Encounter #  
 8/11/2017 10:30 AM Bard Abisai MD [facility] [phone] [id number] Follow-up Instructions Return in about 6 months (around 2/11/2018), or if symptoms worsen or fail to improve, for with ekg. Your Appointments 12/13/2017  2:30 PM  
ULTRASOUND with Carlyn Sands MD  
[facility] [address] [facility]) Appt Note: PVR  
 [address] [place] [address] [address]  
[phone] [address] [address] Upcoming Health Maintenance Date Due DTaP/Tdap/Td series (1 - Tdap) 1/20/1947 ZOSTER VACCINE AGE 60> 11/20/1985 GLAUCOMA SCREENING Q2Y 1/20/1991 Pneumococcal 65+ Low/Medium Risk (1 of 2 - PCV13) 1/20/1991 MEDICARE YEARLY EXAM 1/20/1991 INFLUENZA AGE 9 TO ADULT 8/1/2017 Allergies as of 8/11/2017  Review Complete On: 8/11/2017 By: Bard Abisai MD  
  
 Severity Noted Reaction Type Reactions Lyrica [Pregabalin] High 08/26/2014    Swelling Clindamycin Medium 08/26/2014    Other (comments) C. Diff Current Immunizations  Never Reviewed No immunizations on file. Not reviewed this visit You Were Diagnosed With   
  
 Codes Comments Vasovagal episode    -  Primary ICD-10-CM: R55 
ICD-9-CM: 780.2 7/17 likely related to poss mild dehydration, post op state Chronic diastolic congestive heart failure ([facility] [address]     ICD-10-CM: I50.32 
ICD-9-CM: 428.32, 428.0 8/17 mild with edema-trace and +HJR 
avoiding diuresis as no SOB and prone to vagal episodes SSS (sick sinus syndrome) (HCC)     ICD-10-CM: I49.5 ICD-9-CM: 427.81 7/17 zio patch: 1 x 13 sec mobitz 2 and rare short PATs- no Rx needed for now Hyperlipidemia, unspecified hyperlipidemia type     ICD-10-CM: E78.5 ICD-9-CM: 272.4 f/u PCP Vitals  BP Pulse Height(growth percentile) Weight(growth percentile) BMI Smoking Status 133/53 (!) 59 5' 8\" (1.727 m) 163 lb (73.9 kg) 24.78 kg/m2 Former Smoker Vitals History BMI and BSA Data Body Mass Index Body Surface Area 24.78 kg/m 2 1.88 m 2 Preferred Pharmacy Pharmacy Name Phone [facility] [phone] Your Updated Medication List  
  
   
This list is accurate as of: 8/11/17 12:25 PM.  Always use your most recent med list.  
  
  
  
  
 cholecalciferol (vitamin D3) 2,000 unit Tab Take  by Mouth. fluticasone 50 mcg/actuation nasal spray Commonly known as:  FLONASE  
1 [place]. OMEGA-3 FATTY ACIDS-FISH OIL PO  
1,000 mg.  
  
 simvastatin 20 mg tablet Commonly known as:  ZOCOR Take  by mouth nightly. tamsulosin 0.4 mg capsule Commonly known as:  FLOMAX Take 1 Cap by mouth daily. Follow-up Instructions Return in about 6 months (around 2/11/2018), or if symptoms worsen or fail to improve, for with ekg. Patient Instructions Medications Discontinued During This Encounter Medication Reason  oxybutynin (DITROPAN) 5 mg tablet Therapy Completed  oxyCODONE IR (ROXICODONE) 5 mg immediate release tablet Therapy Completed No orders of the defined types were placed in this encounter. Vasovagal Syncope: Care Instructions Your Care Instructions Vasovagal syncope (say \"bsr-bik-TTB-gul AUKB-yag-ytz\") is sudden dizziness or fainting that can be set off by things such as pain, stress, fear, or trauma. You may sweat or feel lightheaded, sick to your stomach, or tingly. The problem causes the heart rate to slow and the blood vessels to widen, or dilate, for a short time. When this happens, blood pools in the lower body, and less blood goes to the brain. You can usually get relief by lying down with your legs raised (elevated).  This helps more blood to flow to your brain and may help relieve symptoms like feeling dizzy. Some doctors may recommend a technique that involves tensing your fists and arms. This type of fainting is often easy to predict. For example, it happens to some people when they see blood or have to get a shot. They may feel symptoms before they faint. An episode of vasovagal syncope usually responds well to self-care. Other treatment often isn't needed. But if the fainting keeps happening, your doctor may suggest further treatments. Follow-up care is a key part of your treatment and safety. Be sure to make and go to all appointments, and call your doctor if you are having problems. It's also a good idea to know your test results and keep a list of the medicines you take. How can you care for yourself at home? · Drink plenty of fluids to prevent dehydration. If you have kidney, heart, or liver disease and have to limit fluids, talk with your doctor before you increase your fluid intake. · Try to avoid things that you think may set off vasovagal syncope. · Talk to your doctor about any medicines you take. Some medicines may increase the chance of this condition occurring. · If you feel symptoms, lie down with your legs raised. Talk to your doctor about what to do if your symptoms come back. When should you call for help? Call 911 anytime you think you may need emergency care. For example, call if: 
· You have symptoms of a heart problem. These may include: ¨ Chest pain or pressure. ¨ Severe trouble breathing. ¨ A fast or irregular heartbeat. Watch closely for changes in your health, and be sure to contact your doctor if: 
· You have more episodes of fainting at home. · You do not get better as expected. Where can you learn more? Go to http://tu-angela.info/. Enter L754 in the search box to learn more about \"Vasovagal Syncope: Care Instructions. \" Current as of: March 20, 2017 Content Version: 11.3 © 8535-9896 [employer].  Care instructions adapted under license by [address] (which disclaims liability or warranty for this information). If you have questions about a medical condition or this instruction, always ask your healthcare professional. [address] any warranty or liability for your use of this information. Please provide this summary of care documentation to your next provider. Your primary care clinician is listed as Candice Jones. If you have any questions after today's visit, please call [phone].

## 2017-12-13 ENCOUNTER — OFFICE VISIT (OUTPATIENT)
Dept: UROLOGY | Age: 82
End: 2017-12-13

## 2017-12-13 VITALS
BODY MASS INDEX: 26.07 KG/M2 | DIASTOLIC BLOOD PRESSURE: 56 MMHG | TEMPERATURE: 97.2 F | WEIGHT: 172 LBS | OXYGEN SATURATION: 99 % | HEART RATE: 63 BPM | SYSTOLIC BLOOD PRESSURE: 140 MMHG | HEIGHT: 68 IN

## 2017-12-13 DIAGNOSIS — N40.0 BPH WITH ELEVATED PSA: Primary | ICD-10-CM

## 2017-12-13 DIAGNOSIS — R97.20 BPH WITH ELEVATED PSA: Primary | ICD-10-CM

## 2017-12-13 LAB
BILIRUB UR QL STRIP: NEGATIVE
GLUCOSE UR-MCNC: NEGATIVE MG/DL
KETONES P FAST UR STRIP-MCNC: NEGATIVE MG/DL
PH UR STRIP: 6 [PH] (ref 4.6–8)
PROT UR QL STRIP: NEGATIVE
SP GR UR STRIP: 1.01 (ref 1–1.03)
UA UROBILINOGEN AMB POC: NORMAL (ref 0.2–1)
URINALYSIS CLARITY POC: CLEAR
URINALYSIS COLOR POC: YELLOW
URINE BLOOD POC: NEGATIVE
URINE LEUKOCYTES POC: NEGATIVE
URINE NITRITES POC: NEGATIVE

## 2017-12-13 NOTE — MR AVS SNAPSHOT
Visit Information Date & Time Provider Department Dept. Phone Encounter #  
 12/13/2017  2:30 PM Twyla Blackman, [address] [place] [address] [id number] Your Appointments 2/19/2018  1:00 PM  
ESTABLISHED PATIENT with Sheryl Vazquez MD  
[facility] [place] ([address]) Appt Note: 6 month follow up  
 [address] [facility] [address]  
  
   
 [address] [place] [address] Upcoming Health Maintenance Date Due DTaP/Tdap/Td series (1 - Tdap) 1[birth year]20/1947 ZOSTER VACCINE AGE 60> 11/20/1985 GLAUCOMA SCREENING Q2Y 1/20/1991 Pneumococcal 65+ Low/Medium Risk (1 of 2 - PCV13) 1/20/1991 MEDICARE YEARLY EXAM 1/20/1991 Influenza Age 5 to Adult 8/1/2017 Allergies as of 12/13/2017  Review Complete On: 12/13/2017 By: Ashanti Lugo LPN Severity Noted Reaction Type Reactions Lyrica [Pregabalin] High 08/26/2014    Swelling Clindamycin Medium 08/26/2014    Other (comments) C. Diff Current Immunizations  Never Reviewed No immunizations on file. Not reviewed this visit You Were Diagnosed With   
  
 Codes Comments BPH with elevated PSA    -  Primary ICD-10-CM: N40.0, R97.20 ICD-9-CM: 600.00, 790.93 Vitals BP Pulse Temp Height(growth percentile) Weight(growth percentile) SpO2  
 140/56 (BP 1 Location: Left arm, BP Patient Position: Sitting) 63 97.2 °F (36.2 °C) 5' 8\" (1.727 m) 172 lb (78 kg) 99% BMI Smoking Status 26.15 kg/m2 Former Smoker Vitals History BMI and BSA Data Body Mass Index Body Surface Area  
 26.15 kg/m 2 1.93 m 2 Preferred Pharmacy Pharmacy Name Phone [address] [facility] [phone] Your Updated Medication List  
  
   
This list is accurate as of: 12/13/17  3:45 PM.  Always use your most recent med list.  
  
  
  
  
 cholecalciferol (vitamin D3) 2,000 unit Tab Take  by Mouth. fluticasone 50 mcg/actuation nasal spray Commonly known as:  FLONASE  
1 [place]. OMEGA-3 FATTY ACIDS-FISH OIL PO  
1,000 mg.  
  
 simvastatin 20 mg tablet Commonly known as:  ZOCOR Take  by mouth nightly. tamsulosin 0.4 mg capsule Commonly known as:  FLOMAX Take 1 Cap by mouth daily. We Performed the Following AMB POC URINALYSIS DIP STICK AUTO W/O MICRO [02669 CPT(R)] RI COLLECTION VENOUS BLOOD,VENIPUNCTURE [id number] CPT(R)] RI LEVI,POST-VOID RES,US,NON-IMAGING [id number] CPT(R)] PSA, DIAGNOSTIC (PROSTATE SPECIFIC AG) [id number] CPT(R)] Patient Instructions Prostate Cancer Screening: Care Instructions Your Care Instructions The prostate gland is an organ found just below a man's bladder. It is the size and shape of a walnut. It surrounds the tube that carries urine from the bladder out of the body through the penis. This tube is called the urethra. Prostate cancer is the abnormal growth of cells in the prostate. It is the second most common type of cancer in men. (Skin cancer is the most common.) Most cases of prostate cancer occur in men older than 72. The disease runs in families. And it's more common in -American men. When it's found and treated early, prostate cancer may be cured. But it is not always treated. This is because prostate cancer may not shorten your life, especially if you are older and the cancer is growing slowly. Follow-up care is a key part of your treatment and safety. Be sure to make and go to all appointments, and call your doctor if you are having problems. It's also a good idea to know your test results and keep a list of the medicines you take. What are the screening tests for prostate cancer? The main screening test for prostate cancer is the prostate-specific antigen (PSA) test. This is a blood test that measures how much PSA is in your blood.  A high level may mean that you have an enlargement, an infection, or cancer. Along with the PSA test, you may have a digital rectal exam. The digital (finger) rectal exam checks for anything abnormal in your prostate. To do the exam, the doctor puts a lubricated, gloved finger into your rectum. If these tests suggest cancer, you may need a prostate biopsy. How is prostate cancer diagnosed? In a biopsy, the doctor takes small tissue samples from your prostate gland. Another doctor then looks at the tissue under a microscope to see if there are cancer cells, signs of infection, or other problems. The results help diagnose prostate cancer. What are the pros and cons of screening? Neither a PSA test nor a digital rectal exam can tell you for sure that you do or do not have cancer. But they can help you decide if you need more tests, such as a prostate biopsy. Screening tests may be useful because most men with prostate cancer don't have symptoms. It can be hard to know if you have cancer until it is more advanced. And then it's harder to treat. But having a PSA test can also cause harm. The test may show high levels of PSA that aren't caused by cancer. So you could have a prostate biopsy you didn't need. Or the PSA test might be normal when there is cancer, so a cancer might not be found early. The test can also find cancers that would never have caused a problem during your lifetime. So you might have treatment that was not needed. Prostate cancer usually develops late in life and grows slowly. For many men, it does not shorten their lives. Some experts advise screening only for men who are at high risk. Talk with your doctor to see if screening is right for you. Where can you learn more? Go to http://tu-angela.info/. Enter R550 in the search box to learn more about \"Prostate Cancer Screening: Care Instructions. \" Current as of: May 12, 2017 Content Version: 11.4 © 3231-2226 [employer]. Care instructions adapted under license by [employer] (which disclaims liability or warranty for this information). If you have questions about a medical condition or this instruction, always ask your healthcare professional. [address] any warranty or liability for your use of this information. Please provide this summary of care documentation to your next provider. Your primary care clinician is listed as Thien Tavera. If you have any questions after today's visit, please call [phone].

## 2017-12-13 NOTE — PROGRESS NOTES
[place] 80 y.o. male     Mr. Donell Edouard seen today for symptomatic BPH with urinary retention relieved by TURP in April 2017-now followed with serial PVR assessment of voiding efficiency patient has a solid urinary stream with good control nocturia once per night    TURP pathology 19 May 2017 MS 17-2/9/2005 19 g of tissue benign histology      irritative voiding symptoms-patient complains of nocturia 5-6 times per night associated with severe and sudden urgency episodes during the daytime during the past several months-patient has been treated with alpha-blocker and anticholinergic medication for relief of irritable bladder symptoms caused by BPH under the care of a urologist in [place] but managed by primary physician in [place] for the past 5 years with Ditropan and Flomax      Urinary stream is weak and intermittent-no dysuria-no fever flank pain or pain in the perineum-  No history of  tract disease, trauma, or surgery      PVR 5 71 cc on for May 2017  PVR  210 in June 2017   in December 2017      PSA 10.0 on 4 May 2017          Review of Systems:   CNS: No seizures syncope headaches dizziness or visual changes  Respiratory: No wheezing no shortness of breath no coughing-  Cardiovascular: No chest pains no palpitations  Intestinal: No dyspepsia diarrhea or constipation  Urinary: Irritable bladder symptoms for several years  Skeletal: Large joint arthritis  Endocrine: No diabetes or thyroid disease   other:    Allergies: Allergies   Allergen Reactions    Lyrica [Pregabalin] Swelling    Clindamycin Other (comments)     C. Diff      Medications:    Current Outpatient Prescriptions   Medication Sig Dispense Refill    tamsulosin (FLOMAX) 0.4 mg capsule Take 1 Cap by mouth daily. 90 Cap 3    simvastatin (ZOCOR) 20 mg tablet Take  by mouth nightly.  cholecalciferol, vitamin D3, 2,000 unit tab Take  by Mouth.       OMEGA-3 FATTY ACIDS-FISH OIL PO 1,000 mg.      fluticasone (FLONASE) 50 mcg/actuation nasal spray 1 Spray. Past Medical History:   Diagnosis Date    BPH (benign prostatic hyperplasia)     History of shingles 2007    rt arm/neck    Hyperlipidemia       Past Surgical History:   Procedure Laterality Date    HX CATARACT REMOVAL Bilateral 12/2016    dec/2016-left eye, jan/2017-right eye    HX CYST REMOVAL  1947    HX MOHS PROCEDURES  1980s    left ear    HX TURP  07/2017    dizzy episodes post op 2-3 wks     Family History   Problem Relation Age of Onset    Diabetes Father     Diabetes Brother     Heart Attack Neg Hx     Stroke Neg Hx         Physical Examination: Well-nourished mature male in no apparent distress        Urinalysis: Negative dipstick/nitrite negative/heme-negative    PVR today 353 cc    Impression: Traumatic BPH with urinary retention relieved by TURP/stable on alpha-blocker                                Therapy                        -Elevated PSA with negative TURP chip pathology        Plan: Continue Flomax 0.4 mg daily            PSA today    rtc 6 mo PVR PSA      More than 1/2 of this 15 minute visit was spent in counselling and coordination of care, as described above. Matt Metzger MD  -electronically signed-    PLEASE NOTE:  This document has been produced using voice recognition software. Unrecognized errors in transcription may be present.

## 2017-12-13 NOTE — PATIENT INSTRUCTIONS
Prostate Cancer Screening: Care Instructions  Your Care Instructions    The prostate gland is an organ found just below a man's bladder. It is the size and shape of a walnut. It surrounds the tube that carries urine from the bladder out of the body through the penis. This tube is called the urethra. Prostate cancer is the abnormal growth of cells in the prostate. It is the second most common type of cancer in men. (Skin cancer is the most common.)  Most cases of prostate cancer occur in men older than 72. The disease runs in families. And it's more common in -American men. When it's found and treated early, prostate cancer may be cured. But it is not always treated. This is because prostate cancer may not shorten your life, especially if you are older and the cancer is growing slowly. Follow-up care is a key part of your treatment and safety. Be sure to make and go to all appointments, and call your doctor if you are having problems. It's also a good idea to know your test results and keep a list of the medicines you take. What are the screening tests for prostate cancer? The main screening test for prostate cancer is the prostate-specific antigen (PSA) test. This is a blood test that measures how much PSA is in your blood. A high level may mean that you have an enlargement, an infection, or cancer. Along with the PSA test, you may have a digital rectal exam. The digital (finger) rectal exam checks for anything abnormal in your prostate. To do the exam, the doctor puts a lubricated, gloved finger into your rectum. If these tests suggest cancer, you may need a prostate biopsy. How is prostate cancer diagnosed? In a biopsy, the doctor takes small tissue samples from your prostate gland. Another doctor then looks at the tissue under a microscope to see if there are cancer cells, signs of infection, or other problems. The results help diagnose prostate cancer.   What are the pros and cons of screening? Neither a PSA test nor a digital rectal exam can tell you for sure that you do or do not have cancer. But they can help you decide if you need more tests, such as a prostate biopsy. Screening tests may be useful because most men with prostate cancer don't have symptoms. It can be hard to know if you have cancer until it is more advanced. And then it's harder to treat. But having a PSA test can also cause harm. The test may show high levels of PSA that aren't caused by cancer. So you could have a prostate biopsy you didn't need. Or the PSA test might be normal when there is cancer, so a cancer might not be found early. The test can also find cancers that would never have caused a problem during your lifetime. So you might have treatment that was not needed. Prostate cancer usually develops late in life and grows slowly. For many men, it does not shorten their lives. Some experts advise screening only for men who are at high risk. Talk with your doctor to see if screening is right for you. Where can you learn more? Go to http://tu-angela.info/. Enter R550 in the search box to learn more about \"Prostate Cancer Screening: Care Instructions. \"  Current as of: May 12, 2017  Content Version: 11.4  © 6897-0216 [employer], [employer]. Care instructions adapted under license by [employer] (which disclaims liability or warranty for this information). If you have questions about a medical condition or this instruction, always ask your healthcare professional. [facility] any warranty or liability for your use of this information.

## 2017-12-13 NOTE — PROGRESS NOTES
MrMason Coley has a reminder for a \"due or due soon\" health maintenance. I have asked that he contact his primary care provider for follow-up on this health maintenance. RBV Per Dr. Mojgan Cain draw lab today for PSA for BPH with Elevated PSA.

## 2017-12-14 LAB — PSA SERPL-MCNC: 3.4 NG/ML (ref 0–4)

## 2018-02-16 ENCOUNTER — OFFICE VISIT (OUTPATIENT)
Dept: UROLOGY | Age: 83
End: 2018-02-16

## 2018-02-16 VITALS
OXYGEN SATURATION: 97 % | HEART RATE: 85 BPM | HEIGHT: 68 IN | WEIGHT: 171 LBS | BODY MASS INDEX: 25.91 KG/M2 | SYSTOLIC BLOOD PRESSURE: 139 MMHG | DIASTOLIC BLOOD PRESSURE: 60 MMHG

## 2018-02-16 DIAGNOSIS — N40.1 BPH ASSOCIATED WITH NOCTURIA: ICD-10-CM

## 2018-02-16 DIAGNOSIS — R31.0 GROSS HEMATURIA: Primary | ICD-10-CM

## 2018-02-16 DIAGNOSIS — R35.1 BPH ASSOCIATED WITH NOCTURIA: ICD-10-CM

## 2018-02-16 LAB
BILIRUB UR QL STRIP: NEGATIVE
GLUCOSE UR-MCNC: NEGATIVE MG/DL
KETONES P FAST UR STRIP-MCNC: NEGATIVE MG/DL
PH UR STRIP: 6.5 [PH] (ref 4.6–8)
PROT UR QL STRIP: NORMAL
SP GR UR STRIP: 1.01 (ref 1–1.03)
UA UROBILINOGEN AMB POC: NORMAL (ref 0.2–1)
URINALYSIS CLARITY POC: NORMAL
URINALYSIS COLOR POC: NORMAL
URINE BLOOD POC: NORMAL
URINE LEUKOCYTES POC: NORMAL
URINE NITRITES POC: NEGATIVE

## 2018-02-16 RX ORDER — SULFAMETHOXAZOLE AND TRIMETHOPRIM 800; 160 MG/1; MG/1
1 TABLET ORAL 2 TIMES DAILY
Qty: 10 TAB | Refills: 0 | Status: SHIPPED | OUTPATIENT
Start: 2018-02-16 | End: 2018-02-21

## 2018-02-16 NOTE — PROGRESS NOTES
RBV per Dr. Milind Huizar urine sent for culture for Gross Hematuris. RBV per Dr. Milind Huizar urine for cytology for Gross Hematuria .

## 2018-02-16 NOTE — MR AVS SNAPSHOT
[address] [facility] [place] [address] [address] 
[phone] Patient: Afshin Smoker MRN: [id number] Visit Information Date & Time Provider Department Dept. Phone Encounter #  
 2018 11:30 AM [facility] [place] [facility] [phone] [id number] Your Appointments 2018  1:00 PM  
ESTABLISHED PATIENT with Darion Freire MD  
[facility] ([facility]) Appt Note: 6 month follow up  
 [address] [facility] [address]  
  
   
 [address] [facility] [address]  
  
    
 3/9/2018 11:15 AM  
Office Visit with Bruna Nicole MD  
[facility]) Appt Note: check up [address] [facility] [address] [address]  
[phone] [address] [id number]  
  
    
 2018  1:00 PM  
ULTRASOUND with Keiry Thompson MD  
[facility]) Appt Note: RES  
 [address] [place] [address] [address]  
[phone] [address] [facility] [address] Upcoming Health Maintenance Date Due DTaP/Tdap/Td series (1 - Tdap) 1947 ZOSTER VACCINE AGE 60> 1985 GLAUCOMA SCREENING Q2Y 1991 Pneumococcal 65+ Low/Medium Risk (1 of 2 - PCV13) 1991 MEDICARE YEARLY EXAM 1991 Influenza Age 5 to Adult 2017 Allergies as of 2018  Review Complete On: 2018 By: Jay Xiong LPN Severity Noted Reaction Type Reactions Lyrica [Pregabalin] High 2014    Swelling Clindamycin Medium 2014    Other (comments) C. Diff Current Immunizations  Never Reviewed No immunizations on file. Not reviewed this visit You Were Diagnosed With   
  
 Codes Comments Gross hematuria    -  Primary ICD-10-CM: R31.0 ICD-9-CM: 599.71   
 BPH associated with nocturia     ICD-10-CM: N40.1, R35.1 ICD-9-CM: 600.01, 788.43 Vitals BP Pulse Height(growth percentile) Weight(growth percentile) SpO2 BMI  
 139/60 (BP 1 Location: Right arm, BP Patient Position: Sitting) 85 5' 8\" (1.727 m) 171 lb (77.6 kg) 97% 26 kg/m2 Smoking Status Former Smoker Vitals History BMI and BSA Data Body Mass Index Body Surface Area  
 26 kg/m 2 1.93 m 2 Preferred Pharmacy Pharmacy Name Phone [address] [phone] Your Updated Medication List  
  
   
This list is accurate as of: 2/16/18 12:11 PM.  Always use your most recent med list.  
  
  
  
  
 cholecalciferol (vitamin D3) 2,000 unit Tab Take  by Mouth. fluticasone 50 mcg/actuation nasal spray Commonly known as:  FLONASE  
1 Spray daily. OMEGA-3 FATTY ACIDS-FISH OIL PO  
1,000 mg.  
  
 simvastatin 20 mg tablet Commonly known as:  ZOCOR Take  by mouth nightly. tamsulosin 0.4 mg capsule Commonly known as:  FLOMAX Take 1 Cap by mouth daily. We Performed the Following AMB POC URINALYSIS DIP STICK AUTO W/O MICRO [05855 CPT(R)] CULTURE, URINE [id number] CPT(R)] CYTOLOGY NON-GYN [id number] CPT(R)] [place] LEVI,POST-VOID RES,US,NON-IMAGING [id number] CPT(R)] Patient Instructions Blood in the Urine: Care Instructions Your Care Instructions Blood in the urine, or hematuria, may make the urine look red, brown, or pink. There may be blood every time you urinate or just from time to time. You cannot always see blood in the urine, but it will show up in a urine test. 
Blood in the urine may be serious. It should always be checked by a doctor. Your doctor may recommend more tests, including an X-ray, a CT scan, or a cystoscopy (which lets a doctor look inside the urethra and bladder). Blood in the urine can be a sign of another problem.  Common causes are bladder infections and kidney stones. An injury to your groin or your genital area can also cause bleeding in the urinary tract. Very hard exercise-such as running a marathon-can cause blood in the urine. Blood in the urine can also be a sign of kidney disease or cancer in the bladder or kidney. Many cases of blood in the urine are caused by a harmless condition that runs in families. This is called benign familial hematuria. It does not need any treatment. Sometimes your urine may look red or brown even though it does not contain blood. For example, not getting enough fluids (dehydration), taking certain medicines, or having a liver problem can change the color of your urine. Eating foods such as beets, rhubarb, or blackberries or foods with red food coloring can make your urine look red or pink. Follow-up care is a key part of your treatment and safety. Be sure to make and go to all appointments, and call your doctor if you are having problems. It's also a good idea to know your test results and keep a list of the medicines you take. When should you call for help? Call your doctor now or seek immediate medical care if: 
· You have symptoms of a urinary infection. For example: ¨ You have pus in your urine. ¨ You have pain in your back just below your rib cage. This is called flank pain. ¨ You have a fever, chills, or body aches. ¨ It hurts to urinate. ¨ You have groin or belly pain. · You have more blood in your urine. Watch closely for changes in your health, and be sure to contact your doctor if: 
· You have new urination problems. · You do not get better as expected. Where can you learn more? Go to http://tu-angela.info/. Enter G807 in the search box to learn more about \"Blood in the Urine: Care Instructions. \" Current as of: May 12, 2017 Content Version: 11.4 © 2473-1672 [employer].  Care instructions adapted under license by [employer] (which disclaims liability or warranty for this information). If you have questions about a medical condition or this instruction, always ask your healthcare professional. [address] any warranty or liability for your use of this information. Please provide this summary of care documentation to your next provider. Your primary care clinician is listed as Edgard Kirk. If you have any questions after today's visit, please call [phone].

## 2018-02-16 NOTE — PROGRESS NOTES
Chief Complaint   Patient presents with   Alexandre [place] Hematuria       HISTORY OF PRESENT ILLNESS:  Giulia Donovan is a 80 y.o. male who presents today with about a 2 hour history of some painless gross hematuria. He has been followed in this office by Dr. Andi Holliday for a long time and last year about May underwent a TURP. Following this he did well. His tissue was all benign and no other real episodes of any significance have occurred since that time. He was last here in December 2017. He brought in urine today which is actually almost pink in color but is full of blood and crystals but no nitrites or leukocytes are noted. I am going to culture it and empirically start him on Bactrim because this is Friday afternoon and I would also like to get a urine cytology. He will call us back after the first of the week about the culture and I think Dr. Andi Holliday and he can discuss cystoscopy at that time. ROS documented on the chart    Past Medical History:   Diagnosis Date    BPH (benign prostatic hyperplasia)     History of shingles 2007    rt arm/neck    Hyperlipidemia        Past Surgical History:   Procedure Laterality Date    HX CATARACT REMOVAL Bilateral 12/2016    dec/2016-left eye, jan/2017-right eye    HX CYST REMOVAL  1947    HX MOHS PROCEDURES  1980s    left ear    HX TURP  07/2017    dizzy episodes post op 2-3 wks       Social History   Substance Use Topics    Smoking status: Former Smoker     Quit date: 1/1/1960    Smokeless tobacco: Never Used    Alcohol use No       Allergies   Allergen Reactions    Lyrica [Pregabalin] Swelling    Clindamycin Other (comments)     C.  Diff       Family History   Problem Relation Age of Onset    Diabetes Father     Diabetes Brother     Heart Attack Neg Hx     Stroke Neg Hx        Current Outpatient Prescriptions   Medication Sig Dispense Refill    trimethoprim-sulfamethoxazole (BACTRIM DS, SEPTRA DS) 160-800 mg per tablet Take 1 Tab by mouth two (2) times a day for 5 days. 10 Tab 0    tamsulosin (FLOMAX) 0.4 mg capsule Take 1 Cap by mouth daily. 90 Cap 3    simvastatin (ZOCOR) 20 mg tablet Take  by mouth nightly.  cholecalciferol, vitamin D3, 2,000 unit tab Take  by Mouth.  OMEGA-3 FATTY ACIDS-FISH OIL PO 1,000 mg.      fluticasone (FLONASE) 50 mcg/actuation nasal spray 1 Spray daily. PHYSICAL EXAMINATION:   Visit Vitals    /60 (BP 1 Location: Right arm, BP Patient Position: Sitting)    Pulse 85    Ht 5' 8\" (1.727 m)    Wt 171 lb (77.6 kg)    SpO2 97%    BMI 26 kg/m2     Constitutional: WDWN, Pleasant and appropriate affect, No acute distress. CV:  No peripheral swelling noted  Respiratory: No respiratory distress or difficulties  Abdomen:  No abdominal masses or tenderness. No CVA tenderness. No inguinal hernias noted.  Male: Deferred today. Deferred today skin: No jaundice. Neuro/Psych:  Alert and oriented x 3, affect appropriate. Lymphatic:   No enlarged inguinal lymph nodes. Results for orders placed or performed in visit on 02/16/18   AMB POC URINALYSIS DIP STICK AUTO W/O MICRO   Result Value Ref Range    Color (UA POC) Red     Clarity (UA POC) Cloudy     Glucose (UA POC) Negative Negative    Bilirubin (UA POC) Negative Negative    Ketones (UA POC) Negative Negative    Specific gravity (UA POC) 1.015 1.001 - 1.035    Blood (UA POC) 3+ Negative    pH (UA POC) 6.5 4.6 - 8.0    Protein (UA POC) 3+ Negative    Urobilinogen (UA POC) 0.2 mg/dL 0.2 - 1    Nitrites (UA POC) Negative Negative    Leukocyte esterase (UA POC) 3+ Negative     He had 177 cc of post void residual.    REVIEW OF LABS AND IMAGING:     Imaging Report Reviewed? YES     Images Reviewed? YES           Other Lab Data Reviewed?    YES         ASSESSMENT:     ICD-10-CM ICD-9-CM    1. Gross hematuria R31.0 599.71 AMB POC URINALYSIS DIP STICK AUTO W/O MICRO      [place] LEVI,POST-VOID RES,US,NON-IMAGING      CULTURE, URINE      CYTOLOGY NON-GYN trimethoprim-sulfamethoxazole (BACTRIM DS, SEPTRA DS) 160-800 mg per tablet   2. BPH associated with nocturia N40.1 600.01 [place] LEVI,POST-VOID RES,US,NON-IMAGING    R35.1 788.43             PLAN / DISCUSSION: : I am going to empirically put him on Bactrim through the weekend although his urine really does not look infected. He is totally asymptomatic and will have him return in 3 weeks. The patient expresses understanding and agreement of the discussion and plan. Marlen Mari MD on 2/16/2018         Please note: This document has been produced using voice recognition software. Unrecognized errors in transcription may be present.

## 2018-02-16 NOTE — PROGRESS NOTES
Mr. Zeeshan Villarreal has a reminder for a \"due or due soon\" health maintenance. I have asked that he contact his primary care provider for follow-up on this health maintenance. RBV Per Dr. García Tidwell DS one twice daily for 5 days #10 with no refills sent to pharmacy.

## 2018-02-16 NOTE — PATIENT INSTRUCTIONS

## 2018-02-18 LAB — BACTERIA UR CULT: NORMAL

## 2018-02-19 ENCOUNTER — OFFICE VISIT (OUTPATIENT)
Dept: CARDIOLOGY CLINIC | Age: 83
End: 2018-02-19

## 2018-02-19 VITALS
SYSTOLIC BLOOD PRESSURE: 127 MMHG | HEIGHT: 68 IN | BODY MASS INDEX: 25.76 KG/M2 | HEART RATE: 80 BPM | WEIGHT: 170 LBS | DIASTOLIC BLOOD PRESSURE: 50 MMHG

## 2018-02-19 DIAGNOSIS — I50.32 CHRONIC DIASTOLIC CONGESTIVE HEART FAILURE (HCC): Primary | ICD-10-CM

## 2018-02-19 DIAGNOSIS — R55 VASOVAGAL EPISODE: ICD-10-CM

## 2018-02-19 DIAGNOSIS — E78.5 HYPERLIPIDEMIA, UNSPECIFIED HYPERLIPIDEMIA TYPE: ICD-10-CM

## 2018-02-19 DIAGNOSIS — I49.5 SSS (SICK SINUS SYNDROME) (HCC): ICD-10-CM

## 2018-02-19 NOTE — PROGRESS NOTES
HISTORY OF PRESENT ILLNESS  Giulia Donovan is a 80 y.o. male. CHF   The history is provided by the medical records. This is a chronic problem. Pertinent negatives include no chest pain, no headaches and no shortness of breath. Cholesterol Problem   The history is provided by the medical records. This is a chronic problem. Pertinent negatives include no chest pain, no headaches and no shortness of breath. Dizziness   The history is provided by the patient. This is a new problem. The current episode started more than 1 week ago (7/17 felt dizzy- 2 episodes, improved with rest, HR in 40s at home that day, off & on Sx for 3-4 days). The problem occurs rarely. The problem has been rapidly improving. Pertinent negatives include no chest pain, no headaches and no shortness of breath. The symptoms are aggravated by bending. The symptoms are relieved by rest. He has tried nothing for the symptoms. Review of Systems   Constitutional: Negative for chills, fever, malaise/fatigue and weight loss. HENT: Negative for nosebleeds. Eyes: Negative for discharge. Respiratory: Negative for cough, shortness of breath and wheezing. Cardiovascular: Negative for chest pain, palpitations, orthopnea, claudication, leg swelling and PND. Gastrointestinal: Negative for diarrhea, nausea and vomiting. Genitourinary: Negative for dysuria and hematuria. Musculoskeletal: Negative for joint pain. Skin: Negative for rash. Neurological: Positive for dizziness. Negative for seizures, loss of consciousness and headaches. Endo/Heme/Allergies: Negative for polydipsia. Does not bruise/bleed easily. Psychiatric/Behavioral: Negative for depression and substance abuse. The patient does not have insomnia. Allergies   Allergen Reactions    Lyrica [Pregabalin] Swelling    Clindamycin Other (comments)     C.  Diff       Past Medical History:   Diagnosis Date    BPH (benign prostatic hyperplasia)     History of shingles 2007    rt arm/neck    Hyperlipidemia        Family History   Problem Relation Age of Onset    Diabetes Father     Diabetes Brother     Heart Attack Neg Hx     Stroke Neg Hx        Social History   Substance Use Topics    Smoking status: Former Smoker     Quit date: 1/1/1960    Smokeless tobacco: Never Used    Alcohol use No        Current Outpatient Prescriptions   Medication Sig    trimethoprim-sulfamethoxazole (BACTRIM DS, SEPTRA DS) 160-800 mg per tablet Take 1 Tab by mouth two (2) times a day for 5 days.  tamsulosin (FLOMAX) 0.4 mg capsule Take 1 Cap by mouth daily.  simvastatin (ZOCOR) 20 mg tablet Take  by mouth nightly.  cholecalciferol, vitamin D3, 2,000 unit tab Take  by Mouth.  OMEGA-3 FATTY ACIDS-FISH OIL PO 1,000 mg. No current facility-administered medications for this visit. Past Surgical History:   Procedure Laterality Date    HX CATARACT REMOVAL Bilateral 12/2016    dec/2016-left eye, jan/2017-right eye    HX CYST REMOVAL  1947    HX MOHS PROCEDURES  1980s    left ear    HX TURP  07/2017    dizzy episodes post op 2-3 wks       Visit Vitals    Ht 5' 8\" (1.727 m)    Wt 170 lb (77.1 kg)    BMI 25.85 kg/m2       Diagnostic Studies:  I have reviewed the relevant tests done on the patient and show as follows  EKG tracings reviewed by me today. No flowsheet data found. Mr. Justin Garcia has a reminder for a \"due or due soon\" health maintenance. I have asked that he contact his primary care provider for follow-up on this health maintenance. 7/17 Zio Patch  SR, rare PVCs, rare PACs, 1 episode of Mobitz2 x 13 secs at 230 am, rare short PAT      7/17 ECHO  ECHO CARDIOGRAM [address] [facility]  Component Name Value Ref Range   EF Echo 60     Result Impression   :   1. NORMAL LEFT VENTRICULAR SYSTOLIC FUNCTION AND CAVITY SIZE.  LEFT VENTRICULAR EJECTION   FRACTION IS 60%.  MILD LEFT VENTRICULAR DIASTOLIC DYSFUNCTION.    2. NORMAL RIGHT VENTRICULAR SYSTOLIC FUNCTION AND CAVITY SIZE. 3. NO HEMODYNAMICALLY SIGNIFICANT VALVULAR PATHOLOGY. 4. UNABLE TO ESTIMATE SYSTOLIC PULMONARY ARTERY PRESSURE. NO PREVIOUS REPORT FOR COMPARISON. Physical Exam   Constitutional: He is oriented to person, place, and time. He appears well-developed and well-nourished. No distress. HENT:   Head: Normocephalic and atraumatic. Mouth/Throat: Normal dentition. Eyes: Right eye exhibits no discharge. Left eye exhibits no discharge. No scleral icterus. Neck: Neck supple. Hepatojugular reflux present. No JVD present. Carotid bruit is not present. No thyromegaly present. Cardiovascular: Normal rate, regular rhythm, S1 normal, S2 normal, normal heart sounds and intact distal pulses. Exam reveals no gallop and no friction rub. No murmur heard. Pulmonary/Chest: Effort normal and breath sounds normal. He has no wheezes. He has no rales. Abdominal: Soft. He exhibits no mass. There is no tenderness. Musculoskeletal: He exhibits edema (trace). Lymphadenopathy:        Right cervical: No superficial cervical adenopathy present. Left cervical: No superficial cervical adenopathy present. Neurological: He is alert and oriented to person, place, and time. Skin: Skin is warm and dry. No rash noted. Psychiatric: He has a normal mood and affect. His behavior is normal.       ASSESSMENT and PLAN        Diagnoses and all orders for this visit:    1. Chronic diastolic congestive heart failure ([address]  Comments:  2/18 Glenn Genre- avoid diuretics due to vasovagal spisode;   8/17 mild with edema-trace and +HJR  avoiding diuresis as no SOB and prone to vagal episodes    2. SSS (sick sinus syndrome) ([facility])  Comments:  7/17 zio patch: 1 x 13 sec mobitz 2 and rare short PATs- no Rx needed for now      3.  Hyperlipidemia, unspecified hyperlipidemia type  Comments:  f/u PCP      4. Vasovagal episode  Comments:  2/18 no recurrence;   7/17 likely related to poss mild dehydration, post op state          Pertinent laboratory and test data reviewed and discussed with patient. See patient instructions also for other medical advice given    Medications Discontinued During This Encounter   Medication Reason    fluticasone (FLONASE) 50 mcg/actuation nasal spray Therapy Completed       Follow-up Disposition:  Return if symptoms worsen or fail to improve.

## 2018-02-19 NOTE — PROGRESS NOTES
1. Have you been to the ER, urgent care clinic since your last visit? Hospitalized since your last visit? No  2. Have you seen or consulted any other health care providers outside of the [address] [place] [address] since your last visit? Include any pap smears or colon screening. Yes Where: Dr. Miriam Ibanez     3. Since your last visit, have you had any of the following symptoms? no       4. Have you had any blood work, X-rays or cardiac testing? [facility]       5. Where do you normally have your labs drawn? Dr. Miriam Ibanez  6. Do you need any refills today?    no

## 2018-02-19 NOTE — PATIENT INSTRUCTIONS
Medications Discontinued During This Encounter   Medication Reason    fluticasone (FLONASE) 50 mcg/actuation nasal spray Therapy Completed       No orders of the defined types were placed in this encounter. Learning About Heart Failure  What is heart failure? Heart failure means that your heart muscle does not pump as much blood as your body needs. Failure does not mean that your heart has stopped. It means that your heart is not pumping as well as it should. Your body has an amazing ability to make up for heart failure. It may do such a good job that you don't know you have a disease. But at some point, your heart and body will no longer be able to keep up. Then fluid starts to build up in your lungs and other parts of your body. What can you expect when you have heart failure? Heart failure is a lifelong (chronic) disease. Treatment may be able to slow the disease and help you feel better. But heart failure tends to get worse over time. Despite this, there are many steps you can take to feel better and stay healthy longer. Early on, your symptoms may not be too bad. As heart failure gets worse, symptoms typically get worse, and you may need to limit your activities. Heart failure can also get worse suddenly. If this happens, you need emergency care. Then, after treatment, your symptoms may go back to being stable (which means they stay the same) for a long time. Heart failure can lead to other health problems, such as heart rhythm problems. Over time, your treatment options may change, especially as your symptoms get worse. As heart failure gets worse, palliative care can help improve the quality of your life. You can do advance care planning todecide what kind of care you want at the end of your life. What are the symptoms? Symptoms of heart failure start to happen when your heart can't pump enough blood to the rest of your body.   In the early stages of heart failure, you may:  · Feel tired easily. · Be short of breath when you exert yourself. · Feel like your heart is pounding or racing (palpitations). · Feel weak or dizzy. As heart failure gets worse, fluid starts to build up in your lungs and other parts of your body. This may cause you to:  · Feel short of breath even at rest.  · Have swelling (edema), especially in your legs, ankles, and feet. · Gain weight. This may happen over just a day or two, or more slowly. · Cough or wheeze, especially when you lie down. How is heart failure treated? · You'll probably take several medicines. · You might attend cardiac rehabilitation (rehab) to get education and support that help you make lifestyle changes and stay as healthy as possible. · You may get a heart device. A pacemaker helps your heart pump blood. An ICD can stop abnormal heart rhythms. How can you care for yourself? There are many steps you can take to feel better and stay healthy longer. These steps are an important part of treatment. They can help you stay active and enjoy life. · Take your medicine the right way. Avoid medicines that can make your symptoms worse. · Check your weight and symptoms every day. Know what to do if your symptoms get worse. · Limit sodium. This helps keep fluid from building up. It may help you feel better. · Be active. Exercise regularly, but don't exercise too hard. · Be heart-healthy. Eat healthy foods, stay at a healthy weight, limit alcohol, and don't smoke. · Stay as healthy as possible. Avoid colds and flu, get help for depression and anxiety, and manage stress. Follow-up care is a key part of your treatment and safety. Be sure to make and go to all appointments, and call your doctor if you are having problems. It's also a good idea to know your test results and keep a list of the medicines you take. Where can you learn more? Go to http://tu-angela.info/.   Enter Q345 in the search box to learn more about \"Learning About Heart Failure. \"  Current as of: September 21, 2016  Content Version: 11.4  © 1897-5871 Healthwise, [employer]. Care instructions adapted under license by [employer] (which disclaims liability or warranty for this information). If you have questions about a medical condition or this instruction, always ask your healthcare professional. [place] [address] any warranty or liability for your use of this information.

## 2018-02-19 NOTE — LETTER
Giana Yangst 
1/20/1926 2/19/2018 Dear Yuko Conner MD 
 
I had the pleasure of evaluating  Mr. Stephani Soares in office today. Below are the relevant portions of my assessment and plan of care. ICD-10-CM ICD-9-CM 1. Chronic diastolic congestive heart failure ([address] I50.32 428.32   
  428.0   
 2/18 [facility] avoid diuretics due to vasovagal spisode;  
8/17 mild with edema-trace and +HJR 
avoiding diuresis as no SOB and prone to vagal episodes 2. SSS (sick sinus syndrome) ([facility]) I49.5 427.81   
 7/17 zio patch: 1 x 13 sec mobitz 2 and rare short PATs- no Rx needed for now 3. Hyperlipidemia, unspecified hyperlipidemia type E78.5 272.4 f/u PCP 4. Vasovagal episode R55 780.2 2/18 no recurrence;  
7/17 likely related to poss mild dehydration, post op state Current Outpatient Prescriptions Medication Sig Dispense Refill  trimethoprim-sulfamethoxazole (BACTRIM DS, SEPTRA DS) 160-800 mg per tablet Take 1 Tab by mouth two (2) times a day for 5 days. 10 Tab 0  
 tamsulosin (FLOMAX) 0.4 mg capsule Take 1 Cap by mouth daily. 90 Cap 3  
 simvastatin (ZOCOR) 20 mg tablet Take  by mouth nightly.  cholecalciferol, vitamin D3, 2,000 unit tab Take  by Mouth.  OMEGA-3 FATTY ACIDS-FISH OIL PO 1,000 mg. No orders of the defined types were placed in this encounter. If you have questions, please do not hesitate to call me. I look forward to following Mr. Stephani Soares along with you. Sincerely, Sohan Goyal MD

## 2018-02-19 NOTE — MR AVS SNAPSHOT
[address] [facility] 
 
 
 [address] [facility] 
[phone] Patient: Aida Mathew MRN: [id number] Visit Information Date & Time Provider Department Dept. Phone Encounter #  
 2018  1:00 PM Faraz Patten MD [facility] [place] [phone] Follow-up Instructions Return if symptoms worsen or fail to improve. Your Appointments 2018  1:00 PM  
ESTABLISHED PATIENT with Faraz Patten MD  
[facility] [place] ([facility]) Appt Note: 6 month follow up  
 [address] [place]  [facility] [address]  
  
   
 [address] [address] [place] [address]  
  
    
 3/9/2018 11:15 AM  
Office Visit with Mary Gold MD  
[facility]) Appt Note: check up [address] [address]  
[phone] [address] [address]  
  
    
 3/12/2018  2:30 PM  
Office Visit with Miguel Osuna MD  
[facility]) Appt Note: Gross Hematuria / Cysto [address] [place] [address] [address]  
[phone] [address] [phone]  
  
    
 2018  1:00 PM  
ULTRASOUND with Miguel Osuna MD  
[facility]) Appt Note: RES  
 [address] [place] [address] [address]  
[phone] [address] [facility] [address] Upcoming Health Maintenance Date Due DTaP/Tdap/Td series (1 - Tdap) 1947 ZOSTER VACCINE AGE 60> 1985 GLAUCOMA SCREENING Q2Y 1991 Pneumococcal 65+ Low/Medium Risk (1 of 2 - PCV13) 1991 MEDICARE YEARLY EXAM 1991 Influenza Age 5 to Adult 2017 Allergies as of 2018  Review Complete On: 2018 By: Faraz Patten MD  
  
 Severity Noted Reaction Type Reactions Lyrica [Pregabalin] High 08/26/2014    Swelling Clindamycin Medium 08/26/2014    Other (comments) C. Diff Current Immunizations  Never Reviewed No immunizations on file. Not reviewed this visit You Were Diagnosed With   
  
 Codes Comments Chronic diastolic congestive heart failure ([facility]    -  Primary ICD-10-CM: I50.32 
ICD-9-CM: 428.32, 428.0 2/18 Lyndal Finders- avoid diuretics due to vasovagal spisode;  
8/17 mild with edema-trace and +HJR 
avoiding diuresis as no SOB and prone to vagal episodes SSS (sick sinus syndrome) ([facility])     ICD-10-CM: I49.5 ICD-9-CM: 427.81 7/17 zio patch: 1 x 13 sec mobitz 2 and rare short PATs- no Rx needed for now Hyperlipidemia, unspecified hyperlipidemia type     ICD-10-CM: E78.5 ICD-9-CM: 272.4 f/u PCP Vasovagal episode     ICD-10-CM: R55 
ICD-9-CM: 780.2 2/18 no recurrence;  
7/17 likely related to poss mild dehydration, post op state Vitals BP Pulse Height(growth percentile) Weight(growth percentile) BMI Smoking Status 127/50 80 5' 8\" (1.727 m) 170 lb (77.1 kg) 25.85 kg/m2 Former Smoker Vitals History BMI and BSA Data Body Mass Index Body Surface Area  
 25.85 kg/m 2 1.92 m 2 Preferred Pharmacy Pharmacy Name Phone [address] [phone] Your Updated Medication List  
  
   
This list is accurate as of: 2/19/18 12:48 PM.  Always use your most recent med list.  
  
  
  
  
 cholecalciferol (vitamin D3) 2,000 unit Tab Take  by Mouth. OMEGA-3 FATTY ACIDS-FISH OIL PO  
1,000 mg.  
  
 simvastatin 20 mg tablet Commonly known as:  ZOCOR Take  by mouth nightly. tamsulosin 0.4 mg capsule Commonly known as:  FLOMAX Take 1 Cap by mouth daily. trimethoprim-sulfamethoxazole 160-800 mg per tablet Commonly known as:  BACTRIM DS, SEPTRA DS Take 1 Tab by mouth two (2) times a day for 5 days. Follow-up Instructions  Return if symptoms worsen or fail to improve. Patient Instructions Medications Discontinued During This Encounter Medication Reason  fluticasone (FLONASE) 50 mcg/actuation nasal spray Therapy Completed No orders of the defined types were placed in this encounter. Learning About Heart Failure What is heart failure? Heart failure means that your heart muscle does not pump as much blood as your body needs. Failure does not mean that your heart has stopped. It means that your heart is not pumping as well as it should. Your body has an amazing ability to make up for heart failure. It may do such a good job that you don't know you have a disease. But at some point, your heart and body will no longer be able to keep up. Then fluid starts to build up in your lungs and other parts of your body. What can you expect when you have heart failure? Heart failure is a lifelong (chronic) disease. Treatment may be able to slow the disease and help you feel better. But heart failure tends to get worse over time. Despite this, there are many steps you can take to feel better and stay healthy longer. Early on, your symptoms may not be too bad. As heart failure gets worse, symptoms typically get worse, and you may need to limit your activities. Heart failure can also get worse suddenly. If this happens, you need emergency care. Then, after treatment, your symptoms may go back to being stable (which means they stay the same) for a long time. Heart failure can lead to other health problems, such as heart rhythm problems. Over time, your treatment options may change, especially as your symptoms get worse. As heart failure gets worse, palliative care can help improve the quality of your life. You can do advance care planning todecide what kind of care you want at the end of your life. What are the symptoms?  
Symptoms of heart failure start to happen when your heart can't pump enough blood to the rest of your body. In the early stages of heart failure, you may: · Feel tired easily. · Be short of breath when you exert yourself. · Feel like your heart is pounding or racing (palpitations). · Feel weak or dizzy. As heart failure gets worse, fluid starts to build up in your lungs and other parts of your body. This may cause you to: · Feel short of breath even at rest. 
· Have swelling (edema), especially in your legs, ankles, and feet. · Gain weight. This may happen over just a day or two, or more slowly. · Cough or wheeze, especially when you lie down. How is heart failure treated? · You'll probably take several medicines. · You might attend cardiac rehabilitation (rehab) to get education and support that help you make lifestyle changes and stay as healthy as possible. · You may get a heart device. A pacemaker helps your heart pump blood. An ICD can stop abnormal heart rhythms. How can you care for yourself? There are many steps you can take to feel better and stay healthy longer. These steps are an important part of treatment. They can help you stay active and enjoy life. · Take your medicine the right way. Avoid medicines that can make your symptoms worse. · Check your weight and symptoms every day. Know what to do if your symptoms get worse. · Limit sodium. This helps keep fluid from building up. It may help you feel better. · Be active. Exercise regularly, but don't exercise too hard. · Be heart-healthy. Eat healthy foods, stay at a healthy weight, limit alcohol, and don't smoke. · Stay as healthy as possible. Avoid colds and flu, get help for depression and anxiety, and manage stress. Follow-up care is a key part of your treatment and safety. Be sure to make and go to all appointments, and call your doctor if you are having problems. It's also a good idea to know your test results and keep a list of the medicines you take. Where can you learn more?  
Go to http://tu-angela.info/. Enter Y050 in the search box to learn more about \"Learning About Heart Failure. \" Current as of: September 21, 2016 Content Version: 11.4 © 2082-9170 Healthwise, [employer]. Care instructions adapted under license by [employer] (which disclaims liability or warranty for this information). If you have questions about a medical condition or this instruction, always ask your healthcare professional. [address] any warranty or liability for your use of this information. Please provide this summary of care documentation to your next provider. Your primary care clinician is listed as Yevgeniy Nunez. If you have any questions after today's visit, please call [phone].

## 2018-03-12 ENCOUNTER — OFFICE VISIT (OUTPATIENT)
Dept: UROLOGY | Age: 83
End: 2018-03-12

## 2018-03-12 VITALS
BODY MASS INDEX: 25.76 KG/M2 | OXYGEN SATURATION: 99 % | WEIGHT: 170 LBS | HEIGHT: 68 IN | TEMPERATURE: 97.4 F | SYSTOLIC BLOOD PRESSURE: 120 MMHG | HEART RATE: 75 BPM | DIASTOLIC BLOOD PRESSURE: 70 MMHG

## 2018-03-12 DIAGNOSIS — R31.0 HEMATURIA, GROSS: ICD-10-CM

## 2018-03-12 DIAGNOSIS — N40.1 BPH ASSOCIATED WITH NOCTURIA: Primary | ICD-10-CM

## 2018-03-12 DIAGNOSIS — R35.1 BPH ASSOCIATED WITH NOCTURIA: Primary | ICD-10-CM

## 2018-03-12 RX ORDER — CIPROFLOXACIN 500 MG/1
500 TABLET ORAL 2 TIMES DAILY
Qty: 6 TAB | Refills: 0 | Status: SHIPPED | OUTPATIENT
Start: 2018-03-12 | End: 2018-03-15

## 2018-03-12 RX ORDER — FINASTERIDE 5 MG/1
5 TABLET, FILM COATED ORAL DAILY
Qty: 90 TAB | Refills: 3 | Status: SHIPPED | OUTPATIENT
Start: 2018-03-12 | End: 2021-06-09 | Stop reason: SDUPTHER

## 2018-03-12 NOTE — PROGRESS NOTES
Mr. Sayra Marsh has a reminder for a \"due or due soon\" health maintenance. I have asked that he contact his primary care provider for follow-up on this health maintenance.

## 2018-03-12 NOTE — MR AVS SNAPSHOT
[address] [facility] Steve A [address] [address] 
[phone] Patient: Sonja Jara MRN: [id number] Visit Information Date & Time Provider Department Dept. Phone Encounter #  
 3/12/2018  2:30 PM Mary [facility]and [facility] [id number] Your Appointments 2018  1:00 PM  
Office Visit with Luz Elena Bradley MD  
[facility] Sheila Titus) Appt Note: checkup [address] [place] A [address] [address]  
[phone] [address] [place] [address] Upcoming Health Maintenance Date Due DTaP/Tdap/Td series (1 - Tdap) 1947 ZOSTER VACCINE AGE 60> 1985 GLAUCOMA SCREENING Q2Y 1991 Bone Densitometry (Dexa) Screening 1991 Pneumococcal 65+ Low/Medium Risk (1 of 2 - PCV13) 1991 MEDICARE YEARLY EXAM 1991 Influenza Age 5 to Adult 2017 Allergies as of 3/12/2018  Review Complete On: 3/12/2018 By: Luz Elena Bradley MD  
  
 Severity Noted Reaction Type Reactions Lyrica [Pregabalin] High 2014    Swelling Clindamycin Medium 2014    Other (comments) C. Diff Current Immunizations  Never Reviewed No immunizations on file. Not reviewed this visit You Were Diagnosed With   
  
 Codes Comments BPH associated with nocturia    -  Primary ICD-10-CM: N40.1, R35.1 ICD-9-CM: 600.01, 788.43 Vitals BP Pulse Temp Height(growth percentile) Weight(growth percentile) SpO2  
 120/70 (BP 1 Location: Left arm, BP Patient Position: Sitting) 75 97.4 °F (36.3 °C) (Oral) 5' 8\" (1.727 m) 170 lb (77.1 kg) 99% BMI Smoking Status 25.85 kg/m2 Former Smoker Vitals History BMI and BSA Data Body Mass Index Body Surface Area  
 25.85 kg/m 2 1.92 m 2 Preferred Pharmacy Pharmacy Name Phone  [address], [address] [place] [address] [phone] Your Updated Medication List  
  
   
This list is accurate as of 3/12/18  3:31 PM.  Always use your most recent med list.  
  
  
  
  
 cholecalciferol (vitamin D3) 2,000 unit Tab Take  by Mouth. ciprofloxacin HCl 500 mg tablet Commonly known as:  CIPRO Take 1 Tab by mouth two (2) times a day for 3 days. OMEGA-3 FATTY ACIDS-FISH OIL PO  
1,000 mg.  
  
 simvastatin 20 mg tablet Commonly known as:  ZOCOR Take  by mouth nightly. tamsulosin 0.4 mg capsule Commonly known as:  FLOMAX Take 1 Cap by mouth daily. Prescriptions Sent to Pharmacy Refills  
 ciprofloxacin HCl (CIPRO) 500 mg tablet 0 Sig: Take 1 Tab by mouth two (2) times a day for 3 days. Class: Normal  
 Pharmacy: [facility] [address] [place] [address] Ph #: [phone] Route: Oral  
  
We Performed the Following AMB POC URINALYSIS DIP STICK AUTO W/O MICRO [27572 CPT(R)] Patient Instructions Blood in the Urine: Care Instructions Your Care Instructions Blood in the urine, or hematuria, may make the urine look red, brown, or pink. There may be blood every time you urinate or just from time to time. You cannot always see blood in the urine, but it will show up in a urine test. 
Blood in the urine may be serious. It should always be checked by a doctor. Your doctor may recommend more tests, including an X-ray, a CT scan, or a cystoscopy (which lets a doctor look inside the urethra and bladder). Blood in the urine can be a sign of another problem. Common causes are bladder infections and kidney stones. An injury to your groin or your genital area can also cause bleeding in the urinary tract. Very hard exercise-such as running a marathon-can cause blood in the urine. Blood in the urine can also be a sign of kidney disease or cancer in the bladder or kidney.  Many cases of blood in the urine are caused by a harmless condition that runs in families. This is called benign familial hematuria. It does not need any treatment. Sometimes your urine may look red or brown even though it does not contain blood. For example, not getting enough fluids (dehydration), taking certain medicines, or having a liver problem can change the color of your urine. Eating foods such as beets, rhubarb, or blackberries or foods with red food coloring can make your urine look red or pink. Follow-up care is a key part of your treatment and safety. Be sure to make and go to all appointments, and call your doctor if you are having problems. It's also a good idea to know your test results and keep a list of the medicines you take. When should you call for help? Call your doctor now or seek immediate medical care if: 
· You have symptoms of a urinary infection. For example: ¨ You have pus in your urine. ¨ You have pain in your back just below your rib cage. This is called flank pain. ¨ You have a fever, chills, or body aches. ¨ It hurts to urinate. ¨ You have groin or belly pain. · You have more blood in your urine. Watch closely for changes in your health, and be sure to contact your doctor if: 
· You have new urination problems. · You do not get better as expected. Where can you learn more? Go to http://tu-angela.info/. Enter R615 in the search box to learn more about \"Blood in the Urine: Care Instructions. \" Current as of: May 12, 2017 Content Version: 11.4 © 4377-2188 Healthwise, Incorporated. Care instructions adapted under license by [employer] (which disclaims liability or warranty for this information). If you have questions about a medical condition or this instruction, always ask your healthcare professional. [address] any warranty or liability for your use of this information. Please provide this summary of care documentation to your next provider. Your primary care clinician is listed as Ran Ponce. If you have any questions after today's visit, please call [phone].

## 2018-03-12 NOTE — PATIENT INSTRUCTIONS

## 2018-03-12 NOTE — PROGRESS NOTES
Wei Mcclain 80 y.o. male     Mr. Michell Carrington seen today for follow-up gross hematuria occurring 4 weeks ago lasting several days treated as UTI with antibiotics with urine culture negative at that time  Patient is one-year status post TURP relieving jt urinary retention-now voiding grossly clear urine  BPH with urinary retention relieved by TURP in April 2017-now followed with serial PVR assessment of voiding efficiency patient has a solid urinary stream with good control nocturia once per night     TURP pathology 19 May 2017 MS 17-2/9/2005 19 g of tissue benign histology      irritative voiding symptoms-patient complains of nocturia 5-6 times per night associated with severe and sudden urgency episodes during the daytime during the past several months-patient has been treated with alpha-blocker and anticholinergic medication for relief of irritable bladder symptoms caused by BPH under the care of a urologist in [place] but managed by primary physician in [place] for the past 5 years with Ditropan and Flomax      Urinary stream is weak and intermittent-no dysuria-no fever flank pain or pain in the perineum-  No history of  tract disease, trauma, or surgery      PVR 5 71 cc on for May 2017  PVR  210 in June 2017   in December 2017      PSA 10.0 on 4 May 2017          Review of Systems:   CNS: No seizures syncope headaches dizziness or visual changes  Respiratory: No wheezing no shortness of breath no coughing-  Cardiovascular: No chest pains no palpitations  Intestinal: No dyspepsia diarrhea or constipation  Urinary: Irritable bladder symptoms for several years  Skeletal: Large joint arthritis  Endocrine: No diabetes or thyroid disease   other:    Allergies: Allergies   Allergen Reactions    Lyrica [Pregabalin] Swelling    Clindamycin Other (comments)     C.  Diff      Medications:    Current Outpatient Prescriptions   Medication Sig Dispense Refill    ciprofloxacin HCl (CIPRO) 500 mg tablet Take 1 Tab by mouth two (2) times a day for 3 days. 6 Tab 0    tamsulosin (FLOMAX) 0.4 mg capsule Take 1 Cap by mouth daily. 90 Cap 3    simvastatin (ZOCOR) 20 mg tablet Take  by mouth nightly.  cholecalciferol, vitamin D3, 2,000 unit tab Take  by Mouth.  OMEGA-3 FATTY ACIDS-FISH OIL PO 1,000 mg. Past Medical History:   Diagnosis Date    BPH (benign prostatic hyperplasia)     History of shingles 2007    rt arm/neck    Hyperlipidemia       Past Surgical History:   Procedure Laterality Date    HX CATARACT REMOVAL Bilateral 12/2016    dec/2016-left eye, jan/2017-right eye    HX CYST REMOVAL  1947    HX MOHS PROCEDURES  1980s    left ear    HX TURP  07/2017    dizzy episodes post op 2-3 wks     Family History   Problem Relation Age of Onset    Diabetes Father     Diabetes Brother     Heart Attack Neg Hx     Stroke Neg Hx         Physical Examination: Well-nourished mature male in no apparent distress    Urinalysis: Negative dipstick/nitrite negative/heme-negative      Cystoscopy Report: After prepping the glans penis with Betadine solution and instilling 2% lidocaine jelly intraurethrally a flexible cystoscope was passed through the urethra into the bladder revealing normal urothelium of the bladder and urethra. There are no strictures, stones, tumors, or diverticuli. There is marked trabeculation with extensive cellule formation but no bladder lining outpouching. The prostatic channel is well excavated especially the bladder neck region. Ureteral orifices are both slitlike in appearance with clear urine jets observed from both sides. There is marked injection and friability of blood vessels in the bladder outlet and prostatic channel with active bleeding on touching with the tip of the cystoscope.   Procedure was uncomplicated well-tolerated   EBL minimal   Specimen none        Impression: Gross hematuria secondary to BPH induced blood vessel friability in the bladder outlet      Plan: Cipro 500 mg twice daily ×3 days             Finasteride 5 mg daily #90 refill ×3            Reassurance    rtc 6 mo      More than 1/2 of this 15 minute visit was spent in counselling and coordination of care, as described above. Griffin Vazquez MD  -electronically signed-    PLEASE NOTE:  This document has been produced using voice recognition software. Unrecognized errors in transcription may be present.

## 2018-05-22 RX ORDER — TAMSULOSIN HYDROCHLORIDE 0.4 MG/1
0.4 CAPSULE ORAL DAILY
Qty: 90 CAP | Refills: 3 | Status: SHIPPED | OUTPATIENT
Start: 2018-05-22 | End: 2019-07-09 | Stop reason: SDUPTHER

## 2018-05-22 NOTE — TELEPHONE ENCOUNTER
RBV per Dr Joi Bravo tamsulosin (Flomax) 0.4 mg #90 with 3 refills, take one capsule by mouth daily, sent to [email or web address].

## 2018-09-17 ENCOUNTER — OFFICE VISIT (OUTPATIENT)
Dept: UROLOGY | Age: 83
End: 2018-09-17

## 2018-09-17 VITALS
HEIGHT: 68 IN | OXYGEN SATURATION: 99 % | SYSTOLIC BLOOD PRESSURE: 108 MMHG | DIASTOLIC BLOOD PRESSURE: 52 MMHG | WEIGHT: 176 LBS | HEART RATE: 79 BPM | BODY MASS INDEX: 26.67 KG/M2

## 2018-09-17 DIAGNOSIS — N40.0 BPH WITH ELEVATED PSA: Primary | ICD-10-CM

## 2018-09-17 DIAGNOSIS — R97.20 BPH WITH ELEVATED PSA: Primary | ICD-10-CM

## 2018-09-17 RX ORDER — FLUTICASONE PROPIONATE 50 MCG
SPRAY, SUSPENSION (ML) NASAL
COMMUNITY
Start: 2018-08-03

## 2018-09-17 NOTE — PROGRESS NOTES
Mr. Aric Jason has a reminder for a \"due or due soon\" health maintenance. I have asked that he contact his primary care provider for follow-up on this health maintenance. RBV Per Dr. Debbie Mead draw lab today for PSA for BPH with Elevated PSA.

## 2018-09-17 NOTE — PATIENT INSTRUCTIONS
Prostate Cancer Screening: Care Instructions Your Care Instructions The prostate gland is an organ found just below a man's bladder. It is the size and shape of a walnut. It surrounds the tube that carries urine from the bladder out of the body through the penis. This tube is called the urethra. Prostate cancer is the abnormal growth of cells in the prostate. It is the second most common type of cancer in men. (Skin cancer is the most common.) Most cases of prostate cancer occur in men older than 72. The disease runs in families. And it's more common in -American men. When it's found and treated early, prostate cancer may be cured. But it is not always treated. This is because prostate cancer may not shorten your life, especially if you are older and the cancer is growing slowly. Follow-up care is a key part of your treatment and safety. Be sure to make and go to all appointments, and call your doctor if you are having problems. It's also a good idea to know your test results and keep a list of the medicines you take. What are the screening tests for prostate cancer? The main screening test for prostate cancer is the prostate-specific antigen (PSA) test. This is a blood test that measures how much PSA is in your blood. A high level may mean that you have an enlargement, an infection, or cancer. Along with the PSA test, you may have a digital rectal exam. The digital (finger) rectal exam checks for anything abnormal in your prostate. To do the exam, the doctor puts a lubricated, gloved finger into your rectum. If these tests suggest cancer, you may need a prostate biopsy. How is prostate cancer diagnosed? In a biopsy, the doctor takes small tissue samples from your prostate gland. Another doctor then looks at the tissue under a microscope to see if there are cancer cells, signs of infection, or other problems. The results help diagnose prostate cancer.  
What are the pros and cons of screening? Neither a PSA test nor a digital rectal exam can tell you for sure that you do or do not have cancer. But they can help you decide if you need more tests, such as a prostate biopsy. Screening tests may be useful because most men with prostate cancer don't have symptoms. It can be hard to know if you have cancer until it is more advanced. And then it's harder to treat. But having a PSA test can also cause harm. The test may show high levels of PSA that aren't caused by cancer. So you could have a prostate biopsy you didn't need. Or the PSA test might be normal when there is cancer, so a cancer might not be found early. The test can also find cancers that would never have caused a problem during your lifetime. So you might have treatment that was not needed. Prostate cancer usually develops late in life and grows slowly. For many men, it does not shorten their lives. Some experts advise screening only for men who are at high risk. Talk with your doctor to see if screening is right for you. Where can you learn more? Go to http://tu-angela.info/. Enter R550 in the search box to learn more about \"Prostate Cancer Screening: Care Instructions. \" Current as of: May 12, 2017 Content Version: 11.7 © 9946-0086 [employer] Incorporated. Care instructions adapted under license by [employer] (which disclaims liability or warranty for this information). If you have questions about a medical condition or this instruction, always ask your healthcare professional. [facility] any warranty or liability for your use of this information.

## 2018-09-17 NOTE — MR AVS SNAPSHOT
[address] [facility] A [address] [address] 
[phone] Patient: Emiliano Baca MRN: [id number] Visit Information Date & Time Provider Department Dept. Phone Encounter #  
 2018  1:00 PM Mary Medina Miller [facility] [phone] Your Appointments 3/18/2019  1:00 PM  
ULTRASOUND with Jaime Fuller MD  
[facility] Appt Note: PVR/PSA  
 [address] Steve A [address] [address]  
[phone] [address] [facility] [address] Upcoming Health Maintenance Date Due DTaP/Tdap/Td series (1 - Tdap) 1947 ZOSTER VACCINE AGE 60> 1985 GLAUCOMA SCREENING Q2Y 1991 Pneumococcal 65+ Low/Medium Risk (1 of 2 - PCV13) 1991 MEDICARE YEARLY EXAM 3/14/2018 Influenza Age 5 to Adult 2018 Allergies as of 2018  Review Complete On: 2018 By: India Resendiz LPN Severity Noted Reaction Type Reactions Lyrica [Pregabalin] High 2014    Swelling Clindamycin Medium 2014    Other (comments) C. Diff Current Immunizations  Never Reviewed No immunizations on file. Not reviewed this visit You Were Diagnosed With   
  
 Codes Comments BPH with elevated PSA    -  Primary ICD-10-CM: N40.0, R97.20 ICD-9-CM: 600.00, 790.93 Vitals BP Pulse Height(growth percentile) Weight(growth percentile) SpO2 BMI  
 108/52 (BP 1 Location: Left arm, BP Patient Position: Sitting) 79 5' 8\" (1.727 m) 176 lb (79.8 kg) 99% 26.76 kg/m2 Smoking Status Former Smoker Vitals History BMI and BSA Data Body Mass Index Body Surface Area  
 26.76 kg/m 2 1.96 m 2 Preferred Pharmacy Pharmacy Name Phone Lindsay Meyer, [facility] [phone] Your Updated Medication List  
  
   
This list is accurate as of 9/17/18  2:16 PM.  Always use your most recent med list.  
  
  
  
  
 cholecalciferol (vitamin D3) 2,000 unit Tab Take  by Mouth. finasteride 5 mg tablet Commonly known as:  PROSCAR Take 1 Tab by mouth daily. Indications: benign prostatic hyperplasia with lower urinary tract sx  
  
 fluticasone 50 mcg/actuation nasal spray Commonly known as:  Moris Fields OMEGA-3 FATTY ACIDS-FISH OIL PO  
1,000 mg.  
  
 simvastatin 20 mg tablet Commonly known as:  ZOCOR Take  by mouth nightly. tamsulosin 0.4 mg capsule Commonly known as:  FLOMAX Take 1 Cap by mouth daily. We Performed the Following AMB POC URINALYSIS DIP STICK AUTO W/O MICRO [46340 CPT(R)] COLLECTION VENOUS BLOOD,VENIPUNCTURE [id number] CPT(R)] PSA, DIAGNOSTIC (PROSTATE SPECIFIC AG) [id number] CPT(R)] Patient Instructions Prostate Cancer Screening: Care Instructions Your Care Instructions The prostate gland is an organ found just below a man's bladder. It is the size and shape of a walnut. It surrounds the tube that carries urine from the bladder out of the body through the penis. This tube is called the urethra. Prostate cancer is the abnormal growth of cells in the prostate. It is the second most common type of cancer in men. (Skin cancer is the most common.) Most cases of prostate cancer occur in men older than 72. The disease runs in families. And it's more common in -American men. When it's found and treated early, prostate cancer may be cured. But it is not always treated. This is because prostate cancer may not shorten your life, especially if you are older and the cancer is growing slowly. Follow-up care is a key part of your treatment and safety. Be sure to make and go to all appointments, and call your doctor if you are having problems. It's also a good idea to know your test results and keep a list of the medicines you take. What are the screening tests for prostate cancer? The main screening test for prostate cancer is the prostate-specific antigen (PSA) test. This is a blood test that measures how much PSA is in your blood. A high level may mean that you have an enlargement, an infection, or cancer. Along with the PSA test, you may have a digital rectal exam. The digital (finger) rectal exam checks for anything abnormal in your prostate. To do the exam, the doctor puts a lubricated, gloved finger into your rectum. If these tests suggest cancer, you may need a prostate biopsy. How is prostate cancer diagnosed? In a biopsy, the doctor takes small tissue samples from your prostate gland. Another doctor then looks at the tissue under a microscope to see if there are cancer cells, signs of infection, or other problems. The results help diagnose prostate cancer. What are the pros and cons of screening? Neither a PSA test nor a digital rectal exam can tell you for sure that you do or do not have cancer. But they can help you decide if you need more tests, such as a prostate biopsy. Screening tests may be useful because most men with prostate cancer don't have symptoms. It can be hard to know if you have cancer until it is more advanced. And then it's harder to treat. But having a PSA test can also cause harm. The test may show high levels of PSA that aren't caused by cancer. So you could have a prostate biopsy you didn't need. Or the PSA test might be normal when there is cancer, so a cancer might not be found early. The test can also find cancers that would never have caused a problem during your lifetime. So you might have treatment that was not needed. Prostate cancer usually develops late in life and grows slowly. For many men, it does not shorten their lives. Some experts advise screening only for men who are at high risk. Talk with your doctor to see if screening is right for you. Where can you learn more?  
Go to http://tu-angela.info/. Enter R550 in the search box to learn more about \"Prostate Cancer Screening: Care Instructions. \" Current as of: May 12, 2017 Content Version: 11.7 © 7397-5057 [employer]. Care instructions adapted under license by [employer] (which disclaims liability or warranty for this information). If you have questions about a medical condition or this instruction, always ask your healthcare professional. [address] any warranty or liability for your use of this information. Introducing [facility]! Dear Enio Phelan: Thank you for requesting a [employer] account. Our records indicate that you have previously registered for a [employer] account but its currently inactive. Please call our [employer] support line at [phone]. Additional Information If you have questions, please visit the Frequently Asked Questions section of the [employer] website at [email or web address] Remember, [employer] is NOT to be used for urgent needs. For medical emergencies, dial 911. Now available from your iPhone and Android! Please provide this summary of care documentation to your next provider. Your primary care clinician is listed as Joyce Rice. If you have any questions after today's visit, please call [phone].

## 2018-09-18 LAB — PSA SERPL-MCNC: 3.8 NG/ML (ref 0–4)

## 2018-09-18 NOTE — PROGRESS NOTES
Tory London 80 y.o. male Mr. Gasper Arambula seen today for follow-up symptomatic BPH with urinary retention relieved by TURP today for PVR assessment of voiding efficiency Patient is one-year status post TURP relieving jt urinary retention-now voiding grossly clear urine BPH with urinary retention relieved by TURP in April 2017-now followed with serial PVR assessment of voiding efficiency patient has a solid urinary stream with good control nocturia once per night 
   
TURP pathology 19 May 2017 MS 17-2/9/2005 19 g of tissue benign histology 
   
irritative voiding symptoms-patient complains of nocturia 5-6 times per night associated with severe and sudden urgency episodes during the daytime during the past several months-patient has been treated with alpha-blocker and anticholinergic medication for relief of irritable bladder symptoms caused by BPH under the care of a urologist in [place] but managed by primary physician in [place] for the past 5 years with Ditropan and Flomax 
   
Urinary stream is weak and intermittent-no dysuria-no fever flank pain or pain in the perineum- No history of  tract disease, trauma, or surgery 
   
PVR 5 71 cc on for May 2017 PVR  210 in June 2017  in December 2017 PVR 2 52 cc in September 2018 
   
PSA 10.0 on 4 May 2017 
   
   
Review of Systems:  
CNS: No seizures syncope headaches dizziness or visual changes Respiratory: No wheezing no shortness of breath no coughing- 
Cardiovascular: No chest pains no palpitations Intestinal: No dyspepsia diarrhea or constipation Urinary: Irritable bladder symptoms for several years Skeletal: Large joint arthritis Endocrine: No diabetes or thyroid disease  
other: 
  
 
Allergies: Allergies Allergen Reactions  Lyrica [Pregabalin] Swelling  Clindamycin Other (comments) C. Diff Medications:   
Current Outpatient Prescriptions Medication Sig Dispense Refill  fluticasone (FLONASE) 50 mcg/actuation nasal spray  tamsulosin (FLOMAX) 0.4 mg capsule Take 1 Cap by mouth daily. 90 Cap 3  
 simvastatin (ZOCOR) 20 mg tablet Take  by mouth nightly.  cholecalciferol, vitamin D3, 2,000 unit tab Take  by Mouth.  OMEGA-3 FATTY ACIDS-FISH OIL PO 1,000 mg.    
 finasteride (PROSCAR) 5 mg tablet Take 1 Tab by mouth daily. Indications: benign prostatic hyperplasia with lower urinary tract sx 90 Tab 3 Past Medical History:  
Diagnosis Date  BPH (benign prostatic hyperplasia)  History of shingles 2007  
 rt arm/neck  Hyperlipidemia Past Surgical History:  
Procedure Laterality Date  HX CATARACT REMOVAL Bilateral 12/2016  
 dec/2016-left eye, jan/2017-right eye  HX CYST REMOVAL  1947  HX MOHS PROCEDURES  1980s  
 left ear  HX TURP  07/2017  
 dizzy episodes post op 2-3 wks Social History Social History  Marital status:  Spouse name: N/A  
 Number of children: N/A  
 Years of education: N/A Occupational History  Not on file. Social History Main Topics  Smoking status: Former Smoker Quit date: 1/1/1960  Smokeless tobacco: Never Used  Alcohol use No  
 Drug use: No  
 Sexual activity: Not on file Other Topics Concern  Not on file Social History Narrative Family History Problem Relation Age of Onset  Diabetes Father  Diabetes Brother  Heart Attack Neg Hx  Stroke Neg Hx Physical Examination: Well-nourished mature male in no apparent distress Urinalysis: Negative dipstick/nitrite negative/heme-negative PVR today 252 cc Impression: Symptomatic BPH with urinary retention relieved by TURP-stable status post TURP Plan: Maintain Flomax Rx 0.4 mg daily RTC 6 months PVR More than 1/2 of this 15 minute visit was spent in counselling and coordination of care, as described above.  
Ricardo Olmos MD 
-electronically signed- 
 
PLEASE NOTE: 
This document has been produced using voice recognition software. Unrecognized errors in transcription may be present.

## 2018-11-28 ENCOUNTER — OFFICE VISIT (OUTPATIENT)
Dept: UROLOGY | Age: 83
End: 2018-11-28

## 2018-11-28 VITALS
SYSTOLIC BLOOD PRESSURE: 112 MMHG | BODY MASS INDEX: 26.07 KG/M2 | HEIGHT: 68 IN | OXYGEN SATURATION: 98 % | HEART RATE: 77 BPM | WEIGHT: 172 LBS | DIASTOLIC BLOOD PRESSURE: 55 MMHG

## 2018-11-28 DIAGNOSIS — N30.91 HEMATURIA DUE TO CYSTITIS: ICD-10-CM

## 2018-11-28 DIAGNOSIS — R31.0 GROSS HEMATURIA: Primary | ICD-10-CM

## 2018-11-28 LAB
BILIRUB UR QL STRIP: NEGATIVE
GLUCOSE UR-MCNC: NEGATIVE MG/DL
KETONES P FAST UR STRIP-MCNC: NEGATIVE MG/DL
PH UR STRIP: 6 [PH] (ref 4.6–8)
PROT UR QL STRIP: NORMAL
SP GR UR STRIP: 1.01 (ref 1–1.03)
UA UROBILINOGEN AMB POC: NORMAL (ref 0.2–1)
URINALYSIS CLARITY POC: NORMAL
URINALYSIS COLOR POC: NORMAL
URINE BLOOD POC: NORMAL
URINE LEUKOCYTES POC: NORMAL
URINE NITRITES POC: NEGATIVE

## 2018-11-28 RX ORDER — CIPROFLOXACIN 500 MG/1
500 TABLET ORAL 2 TIMES DAILY
Qty: 20 TAB | Refills: 0 | Status: SHIPPED | OUTPATIENT
Start: 2018-11-28 | End: 2018-12-08

## 2018-11-28 NOTE — PATIENT INSTRUCTIONS
Blood in the Urine: Care Instructions  Your Care Instructions    Blood in the urine, or hematuria, may make the urine look red, brown, or pink. There may be blood every time you urinate or just from time to time. You cannot always see blood in the urine, but it will show up in a urine test.  Blood in the urine may be serious. It should always be checked by a doctor. Your doctor may recommend more tests, including an X-ray, a CT scan, or a cystoscopy (which lets a doctor look inside the urethra and bladder). Blood in the urine can be a sign of another problem. Common causes are bladder infections and kidney stones. An injury to your groin or your genital area can also cause bleeding in the urinary tract. Very hard exercise--such as running a marathon--can cause blood in the urine. Blood in the urine can also be a sign of kidney disease or cancer in the bladder or kidney. Many cases of blood in the urine are caused by a harmless condition that runs in families. This is called benign familial hematuria. It does not need any treatment. Sometimes your urine may look red or brown even though it does not contain blood. For example, not getting enough fluids (dehydration), taking certain medicines, or having a liver problem can change the color of your urine. Eating foods such as beets, rhubarb, or blackberries or foods with red food coloring can make your urine look red or pink. Follow-up care is a key part of your treatment and safety. Be sure to make and go to all appointments, and call your doctor if you are having problems. It's also a good idea to know your test results and keep a list of the medicines you take. When should you call for help? Call your doctor now or seek immediate medical care if:    · You have symptoms of a urinary infection. For example:  ? You have pus in your urine. ? You have pain in your back just below your rib cage. This is called flank pain. ?  You have a fever, chills, or body aches.  ? It hurts to urinate. ? You have groin or belly pain.     · You have more blood in your urine.    Watch closely for changes in your health, and be sure to contact your doctor if:    · You have new urination problems.     · You do not get better as expected. Where can you learn more? Go to http://tu-angela.info/. Enter B640 in the search box to learn more about \"Blood in the Urine: Care Instructions. \"  Current as of: March 21, 2018  Content Version: 11.8  © 2784-5544 [employer]. Care instructions adapted under license by [employer] (which disclaims liability or warranty for this information). If you have questions about a medical condition or this instruction, always ask your healthcare professional. [address] any warranty or liability for your use of this information.

## 2018-11-28 NOTE — PROGRESS NOTES
Mr. Melodie Lee has a reminder for a \"due or due soon\" health maintenance. I have asked that he contact his primary care provider for follow-up on this health maintenance.

## 2018-11-29 NOTE — PROGRESS NOTES
Bernadette Brain 80 y.o. male     Mr. Aamir Stapleton seen today for gross hematuria evaluation-patient voiding blood-tinged urine without clots beginning yesterday-no dysuria no urgency frequency or suprapubic pain  Patient has history of gross hematuria secondary to rupture of ectatic and friable bladder outlet blood vessels  symptomatic BPH with urinary retention relieved by TURP today for PVR assessment of voiding efficiency  Patient is one-year status post TURP relieving jt urinary retention-now voiding grossly clear urine  BPH with urinary retention relieved by TURP in April 2017-now followed with serial PVR assessment of voiding efficiency patient has a solid urinary stream with good control nocturia once per night    Cystoscopy on 12 March 2018 revealed normal urothelium of the bladder and urethra.  There are no strictures, stones, tumors, or diverticuli. Anuj Washington is marked trabeculation with extensive cellule formation but no bladder lining outpouching.  The prostatic channel is well excavated especially the bladder neck region.  Ureteral orifices are both slitlike in appearance with clear urine jets observed from both sides.  There is marked injection and friability of blood vessels in the bladder outlet and prostatic channel with active bleeding on touching with the tip of the cystoscope.      TURP pathology 19 May 2017 MS 17-2/9/2005 19 g of tissue benign histology      irritative voiding symptoms-patient complains of nocturia 5-6 times per night associated with severe and sudden urgency episodes during the daytime during the past several months-patient has been treated with alpha-blocker and anticholinergic medication for relief of irritable bladder symptoms caused by BPH under the care of a urologist in [place] but managed by primary physician in [place] for the past 5 years with Ditropan and Flomax      Urinary stream is weak and intermittent-no dysuria-no fever flank pain or pain in the perineum-  No history of  tract disease, trauma, or surgery      PVR 5 71 cc on for May 2017  PVR  210 in June 2017   in December 2017  PVR 2 52 cc in September 2018      PSA 10.0 on 4 May 2017          Review of Systems:   CNS: No seizures syncope headaches dizziness or visual changes  Respiratory: No wheezing no shortness of breath no coughing-  Cardiovascular: No chest pains no palpitations  Intestinal: No dyspepsia diarrhea or constipation  Urinary: Irritable bladder symptoms for several years  Skeletal: Large joint arthritis  Endocrine: No diabetes or thyroid disease   other:        Allergies: Allergies   Allergen Reactions    Lyrica [Pregabalin] Swelling    Clindamycin Other (comments)     C. Diff      Medications:    Current Outpatient Medications   Medication Sig Dispense Refill    ciprofloxacin HCl (CIPRO) 500 mg tablet Take 1 Tab by mouth two (2) times a day for 10 days. 20 Tab 0    fluticasone (FLONASE) 50 mcg/actuation nasal spray       tamsulosin (FLOMAX) 0.4 mg capsule Take 1 Cap by mouth daily. 90 Cap 3    simvastatin (ZOCOR) 20 mg tablet Take  by mouth nightly.  cholecalciferol, vitamin D3, 2,000 unit tab Take  by Mouth.  OMEGA-3 FATTY ACIDS-FISH OIL PO 1,000 mg.      finasteride (PROSCAR) 5 mg tablet Take 1 Tab by mouth daily.  Indications: benign prostatic hyperplasia with lower urinary tract sx 90 Tab 3       Past Medical History:   Diagnosis Date    BPH (benign prostatic hyperplasia)     History of shingles 2007    rt arm/neck    Hyperlipidemia       Past Surgical History:   Procedure Laterality Date    HX CATARACT REMOVAL Bilateral 12/2016    dec/2016-left eye, jan/2017-right eye    HX CYST REMOVAL  1947    HX MOHS PROCEDURES  1980s    left ear    HX TURP  07/2017    dizzy episodes post op 2-3 wks     Social History     Socioeconomic History    Marital status:      Spouse name: Not on file    Number of children: Not on file    Years of education: Not on file    Highest education level: Not on file   Social Needs    Financial resource strain: Not on file    Food insecurity - worry: Not on file    Food insecurity - inability: Not on file    Transportation needs - medical: Not on file   [employer] needs - non-medical: Not on file   Occupational History    Not on file   Tobacco Use    Smoking status: Former Smoker     Last attempt to quit: 1960     Years since quittin.11    Smokeless tobacco: Never Used   Substance and Sexual Activity    Alcohol use: No    Drug use: No    Sexual activity: Not on file   Other Topics Concern    Not on file   Social History Narrative    Not on file      Family History   Problem Relation Age of Onset    Diabetes Father     Diabetes Brother     Heart Attack Neg Hx     Stroke Neg Hx         Physical Examination: Well-nourished mature male in no apparent distress      Urinalysis: Light nena colored urine/+++heme/negative nitrite    Impression: BPH induced gross hematuria        Plan: C&S urine today             Cipro 500 mg twice daily    RTC 2 days cystoscopy    Described discussed indications for prospect of endoscopic fulguration probable bladder outlet blood vessels causing gross hematuria      More than 1/2 of this 15 minute visit was spent in counselling and coordination of care, as described above. Ethan Marie MD  -electronically signed-    PLEASE NOTE:  This document has been produced using voice recognition software. Unrecognized errors in transcription may be present.

## 2018-11-30 ENCOUNTER — OFFICE VISIT (OUTPATIENT)
Dept: UROLOGY | Age: 83
End: 2018-11-30

## 2018-11-30 VITALS
HEART RATE: 51 BPM | BODY MASS INDEX: 26.15 KG/M2 | HEIGHT: 68 IN | DIASTOLIC BLOOD PRESSURE: 55 MMHG | SYSTOLIC BLOOD PRESSURE: 111 MMHG | OXYGEN SATURATION: 97 %

## 2018-11-30 DIAGNOSIS — N40.1 BPH WITH OBSTRUCTION/LOWER URINARY TRACT SYMPTOMS: ICD-10-CM

## 2018-11-30 DIAGNOSIS — N13.8 BPH WITH OBSTRUCTION/LOWER URINARY TRACT SYMPTOMS: ICD-10-CM

## 2018-11-30 DIAGNOSIS — R31.0 GROSS HEMATURIA: Primary | ICD-10-CM

## 2018-11-30 LAB
BILIRUB UR QL STRIP: NEGATIVE
GLUCOSE UR-MCNC: NEGATIVE MG/DL
KETONES P FAST UR STRIP-MCNC: NEGATIVE MG/DL
PH UR STRIP: 5.5 [PH] (ref 4.6–8)
PROT UR QL STRIP: NEGATIVE
SP GR UR STRIP: 1 (ref 1–1.03)
UA UROBILINOGEN AMB POC: NORMAL (ref 0.2–1)
URINALYSIS CLARITY POC: NORMAL
URINALYSIS COLOR POC: YELLOW
URINE BLOOD POC: NORMAL
URINE LEUKOCYTES POC: NORMAL
URINE NITRITES POC: NEGATIVE

## 2018-11-30 NOTE — PROGRESS NOTES
[facility]  OFFICE PROCEDURE PROGRESS NOTE        Chart reviewed for the following:   IJohn, have reviewed the History, Physical and updated the Allergic reactions for [address] [place] [address] performed immediately prior to start of procedure:   Red Michel, have performed the following reviews on Alexus Ruiz prior to the start of the procedure:            * Patient was identified by name and date of birth   * Agreement on procedure being performed was verified  * Risks and Benefits explained to the patient  * Procedure site verified and marked as necessary  * Patient was positioned for comfort  * Consent was signed and verified     Time: 10:15 AM      Date of procedure: 11/30/2018    Procedure performed by:  Riki Martinez MD    Provider assisted by: John Sloan MA    Patient assisted by: spouse and wife    How tolerated by patient: tolerated the procedure well with no complications    Post Procedural Pain Scale: 0 - No Hurt    Patient verbalized understanding of procedure and post procedure instructions.

## 2018-11-30 NOTE — PROGRESS NOTES
Mr. Megan Marroquin has a reminder for a \"due or due soon\" health maintenance. I have asked that he contact his primary care provider for follow-up on this health maintenance.

## 2018-12-01 NOTE — PROGRESS NOTES
Johana Oliva 80 y.o. male     Mr. Annabelle Garza seen today for cystoscopy assessing transient gross hematuria  Patient complains of voiding blood-tinged urine 1 week ago lasting 2 days-grossly clear urine voided at the present time  Voiding urine with a strong solid stream good control.-Nocturia twice per night on Flomax and finasteride     Has history of gross hematuria secondary to BPH inducedBladder outlet blood vessel congestion and friability as well as history of symptomatic BPH with urinary retention relieved by BPH with urinary retention relieved by TURP in April 2017-now followed with serial PVR assessment of voiding efficiency     Cystoscopy on 12 March 2018 revealed normal urothelium of the bladder and urethra. Melba Himanshu are no strictures, stones, tumors, or diverticuli. Melba Himanshu is marked trabeculation with extensive cellule formation but no bladder lining outpouching.  The prostatic channel is well excavated especially the bladder neck region.  Ureteral orifices are both slitlike in appearance with clear urine jets observed from both sides.  There is marked injection and friability of blood vessels in the bladder outlet and prostatic channel with active bleeding on touching with the tip of the cystoscope.      TURP pathology 19 May 2017 MS 17-2/9/2005 19 g of tissue benign histology        PVR 5 71 cc on for May 2017  PVR  210 in June 2017   in December 2017  PVR 2 52 cc in September 2018      PSA 10.0 on 4 May 2017  PSA 1.8 in September 2018        Review of Systems:   CNS: No seizures syncope headaches dizziness or visual changes  Respiratory: No wheezing no shortness of breath no coughing-  Cardiovascular: No chest pains no palpitations  Intestinal: No dyspepsia diarrhea or constipation  Urinary: Irritable bladder symptoms for several years  Skeletal: Large joint arthritis  Endocrine: No diabetes or thyroid disease   other:    Allergies:    Allergies   Allergen Reactions    Lyrica [Pregabalin] Swelling    Clindamycin Other (comments)     C. Diff      Medications:    Current Outpatient Medications   Medication Sig Dispense Refill    ciprofloxacin HCl (CIPRO) 500 mg tablet Take 1 Tab by mouth two (2) times a day for 10 days. 20 Tab 0    fluticasone (FLONASE) 50 mcg/actuation nasal spray       tamsulosin (FLOMAX) 0.4 mg capsule Take 1 Cap by mouth daily. 90 Cap 3    simvastatin (ZOCOR) 20 mg tablet Take  by mouth nightly.  cholecalciferol, vitamin D3, 2,000 unit tab Take  by Mouth.  OMEGA-3 FATTY ACIDS-FISH OIL PO 1,000 mg.      finasteride (PROSCAR) 5 mg tablet Take 1 Tab by mouth daily.  Indications: benign prostatic hyperplasia with lower urinary tract sx 90 Tab 3       Past Medical History:   Diagnosis Date    BPH (benign prostatic hyperplasia)     History of shingles     rt arm/neck    Hyperlipidemia       Past Surgical History:   Procedure Laterality Date    HX CATARACT REMOVAL Bilateral 2016    dec/2016-left eye, -right eye    HX CYST REMOVAL  194    HX MOHS PROCEDURES  1980s    left ear    HX TURP  2017    dizzy episodes post op 2-3 wks     Social History     Socioeconomic History    Marital status:      Spouse name: Not on file    Number of children: Not on file    Years of education: Not on file    Highest education level: Not on file   Social Needs    Financial resource strain: Not on file    Food insecurity - worry: Not on file    Food insecurity - inability: Not on file   [employer] needs - medical: Not on file   [employer] needs - non-medical: Not on file   Occupational History    Not on file   Tobacco Use    Smoking status: Former Smoker     Last attempt to quit: 1960     Years since quittin.9    Smokeless tobacco: Never Used   Substance and Sexual Activity    Alcohol use: No    Drug use: No    Sexual activity: Not on file   Other Topics Concern    Not on file   Social History Narrative    Not on file      Family History Problem Relation Age of Onset    Diabetes Father     Diabetes Brother     Heart Attack Neg Hx     Stroke Neg Hx         Physical Examination: Nourished mature male in no apparent distress     large benign consistency nontender prostate on MARISEL-    Urinalysis: +pro/negative nitrite/+hehe    Impression:      Cystoscopy Report: After prepping the glans penis with Betadine solution and instilling 2% lidocaine jelly intraurethrally a possible cystoscope was passed through the urethra into the bladder revealing normal urothelium throughout. There are no strictures, stones, or tumors evident. There is marked trabeculation with large cellules and small diverticuli scattered throughout the bladder walls especially laterally-ureteral orifices are both slitlike in appearance with clear urine jets observed from both sides. There is marked injection and friability of blood vessels in the bladder outlet. The prostatic channel is well excavated and widely patent-procedures and complicated well-tolerated  EBL-none  Specimen-none      Impression: Gross hematuria secondary to BPH induced bladder outlet congestion and friability    Plan: Reassurance    Cipro 500 mg twice daily times 3 days    Discussed prospect of endoscopic fulguration for recurrent episodes of gross hematuria-patient has had 2 short-lived gross hematuria episodes during the past year       More than 1/2 of this 15 minute visit was spent in counselling and coordination of care, as described above. Aneesh Underwood MD  -electronically signed-    PLEASE NOTE:  This document has been produced using voice recognition software. Unrecognized errors in transcription may be present.

## 2019-03-15 ENCOUNTER — LAB ONLY (OUTPATIENT)
Dept: UROLOGY | Age: 84
End: 2019-03-15

## 2019-03-15 DIAGNOSIS — R97.20 BPH WITH ELEVATED PSA: Primary | ICD-10-CM

## 2019-03-15 DIAGNOSIS — N40.0 BPH WITH ELEVATED PSA: Primary | ICD-10-CM

## 2019-03-16 LAB — PSA SERPL-MCNC: 4 NG/ML (ref 0–4)

## 2019-03-18 ENCOUNTER — OFFICE VISIT (OUTPATIENT)
Dept: UROLOGY | Age: 84
End: 2019-03-18

## 2019-03-18 VITALS
HEART RATE: 68 BPM | DIASTOLIC BLOOD PRESSURE: 70 MMHG | BODY MASS INDEX: 26.15 KG/M2 | HEIGHT: 68 IN | SYSTOLIC BLOOD PRESSURE: 140 MMHG | OXYGEN SATURATION: 99 %

## 2019-03-18 DIAGNOSIS — N40.0 BENIGN PROSTATIC HYPERPLASIA WITHOUT LOWER URINARY TRACT SYMPTOMS: Primary | ICD-10-CM

## 2019-03-18 DIAGNOSIS — R33.9 URINARY RETENTION: ICD-10-CM

## 2019-03-18 LAB
BILIRUB UR QL STRIP: NEGATIVE
GLUCOSE UR-MCNC: NEGATIVE MG/DL
KETONES P FAST UR STRIP-MCNC: NEGATIVE MG/DL
PH UR STRIP: 5.5 [PH] (ref 4.6–8)
PROT UR QL STRIP: NEGATIVE
SP GR UR STRIP: 1.01 (ref 1–1.03)
UA UROBILINOGEN AMB POC: NORMAL (ref 0.2–1)
URINALYSIS CLARITY POC: CLEAR
URINALYSIS COLOR POC: YELLOW
URINE BLOOD POC: NEGATIVE
URINE LEUKOCYTES POC: NEGATIVE
URINE NITRITES POC: NEGATIVE

## 2019-03-18 NOTE — PROGRESS NOTES
Tonya Irma 80 y.o. male     Mr. Jessica Chatman seen today for follow-up symptomatic BPH with recent episodes of gross hematuria  Now voiding with a solid stream good control nocturia twice per night on Flomax and finasteride       Has history of gross hematuria secondary to BPH inducedBladder outlet blood vessel congestion and friability as well as history of symptomatic BPH with urinary retention relieved by BPH with urinary retention relieved by TURP in April 2017-now followed with serial PVR assessment of voiding efficiency      Cystoscopy on 12 March 2018 revealed normal urothelium of the bladder and urethra.  There are no strictures, stones, tumors, or diverticuli. Curvin Martines is marked trabeculation with extensive cellule formation but no bladder lining outpouching.  The prostatic channel is well excavated especially the bladder neck region.  Ureteral orifices are both slitlike in appearance with clear urine jets observed from both sides.  There is marked injection and friability of blood vessels in the bladder outlet and prostatic channel with active bleeding on touching with the tip of the cystoscope.      TURP pathology 19 May 2017 MS 17-2/9/2005 19 g of tissue benign histology         PVR 5 71 cc on for May 2017  PVR  210 in June 2017   in December 2017   cc in September 2018   cc in March 2019      PSA 10.0 on 4 May 2017  PSA 1.8 in September 2018  PSA 4.0 on 15 March 2019         Review of Systems:   CNS: No seizures syncope headaches dizziness or visual changes  Respiratory: No wheezing no shortness of breath no coughing-  Cardiovascular: No chest pains no palpitations  Intestinal: No dyspepsia diarrhea or constipation  Urinary: Irritable bladder symptoms for several years  Skeletal: Large joint arthritis  Endocrine: No diabetes or thyroid disease   other:      Allergies: Allergies   Allergen Reactions    Lyrica [Pregabalin] Swelling    Clindamycin Other (comments)     C.  Diff Medications:    Current Outpatient Medications   Medication Sig Dispense Refill    tamsulosin (FLOMAX) 0.4 mg capsule Take 1 Cap by mouth daily. 90 Cap 3    simvastatin (ZOCOR) 20 mg tablet Take  by mouth nightly.  cholecalciferol, vitamin D3, 2,000 unit tab Take  by Mouth.  OMEGA-3 FATTY ACIDS-FISH OIL PO 1,000 mg.      fluticasone (FLONASE) 50 mcg/actuation nasal spray       finasteride (PROSCAR) 5 mg tablet Take 1 Tab by mouth daily.  Indications: benign prostatic hyperplasia with lower urinary tract sx 90 Tab 3       Past Medical History:   Diagnosis Date    BPH (benign prostatic hyperplasia)     History of shingles     rt arm/neck    Hyperlipidemia       Past Surgical History:   Procedure Laterality Date    HX CATARACT REMOVAL Bilateral 2016    dec/2016-left eye, -right eye    HX CYST REMOVAL      HX MOHS PROCEDURES  1980s    left ear    HX TURP  2017    dizzy episodes post op 2-3 wks     Social History     Socioeconomic History    Marital status:      Spouse name: Not on file    Number of children: Not on file    Years of education: Not on file    Highest education level: Not on file   Social Needs    Financial resource strain: Not on file    Food insecurity - worry: Not on file    Food insecurity - inability: Not on file   [employer] needs - medical: Not on file   [employer] needs - non-medical: Not on file   Occupational History    Not on file   Tobacco Use    Smoking status: Former Smoker     Last attempt to quit: 1960     Years since quittin.2    Smokeless tobacco: Never Used   Substance and Sexual Activity    Alcohol use: No    Drug use: No    Sexual activity: Not on file   Other Topics Concern    Not on file   Social History Narrative    Not on file      Family History   Problem Relation Age of Onset    Diabetes Father     Diabetes Brother     Heart Attack Neg Hx     Stroke Neg Hx         Physical Examination: Frail elderly male in no apparent distress ambulating with assistance of cane     Benign prostate by MARISEL in November 2018    Urinalysis: Negative dipstick/nitrite negative/heme-negative    Impression: Symptomatic BPH with stable urinary retention status post TURP 2017 for jt                       retention        Plan: RTC 6 months PVR              Continue Flomax and Finasteride Rx    RTC 6 months-PVR      More than 1/2 of this 15 minute visit was spent in counselling and coordination of care, as described above. Bren Nguyen MD  -electronically signed-    PLEASE NOTE:  This document has been produced using voice recognition software. Unrecognized errors in transcription may be present.

## 2019-03-18 NOTE — PROGRESS NOTES
Mr. Lin Nice has a reminder for a \"due or due soon\" health maintenance. I have asked that he contact his primary care provider for follow-up on this health maintenance.

## 2019-03-18 NOTE — PATIENT INSTRUCTIONS
Benign Prostatic Hyperplasia: Care Instructions  Your Care Instructions    Benign prostatic hyperplasia, or BPH, is an enlarged prostate gland. The prostate is a small gland that makes some of the fluid in semen. Prostate enlargement happens to almost all men as they age. It is usually not serious. BPH does not cause prostate cancer. As the prostate gets bigger, it may partly block the flow of urine. You may have a hard time getting a urine stream started or completely stopped. BPH can cause dribbling. You may have a weak urine stream, or you may have to urinate more often than you used to, especially at night. Most men find these problems easy to manage. You do not need treatment unless your symptoms bother you a lot or you have other problems, such as bladder infections or stones. In these cases, medicines may help. Surgery is not needed unless the urine flow is blocked or the symptoms do not get better with medicine. Follow-up care is a key part of your treatment and safety. Be sure to make and go to all appointments, and call your doctor if you are having problems. It's also a good idea to know your test results and keep a list of the medicines you take. How can you care for yourself at home? · Take plenty of time to urinate. Try to relax. · Try \"double voiding. \" Urinate as much you can, relax for a few moments, and then try to urinate again. · Sit on the toilet to urinate. · Read or think of other things while you are waiting. · Turn on a faucet, or try to picture running water. Some men find that this helps get their urine flowing. · If dribbling is a problem, wash your penis daily to avoid skin irritation and infection. · Avoid caffeine and alcohol. These drinks will increase how often you need to urinate. Spread your fluid intake throughout the day. If the urge to urinate often wakes you at night, limit your fluid intake in the evening. Urinate right before you go to bed.   · Many over-the-counter cold and allergy medicines can make the symptoms of BPH worse. Avoid antihistamines, decongestants, and allergy pills, if you can. Read the warnings on the package. · If you take any prescription medicines, especially tranquilizers or antidepressants, ask your doctor or pharmacist whether they can cause urination problems. There may be other medicines you can use that do not cause urinary problems. · Be safe with medicines. Take your medicines exactly as prescribed. Call your doctor if you think you are having a problem with your medicine. When should you call for help? Call your doctor now or seek immediate medical care if:    · You cannot urinate at all.     · You have symptoms of a urinary infection. For example:  ? You have blood or pus in your urine. ? You have pain in your back just below your rib cage. This is called flank pain. ? You have a fever, chills, or body aches. ? It hurts to urinate. ? You have groin or belly pain.    Watch closely for changes in your health, and be sure to contact your doctor if:    · It hurts when you ejaculate.     · Your urinary problems get a lot worse or bother you a lot. Where can you learn more? Go to http://tu-angela.info/. Enter D929 in the search box to learn more about \"Benign Prostatic Hyperplasia: Care Instructions. \"  Current as of: September 26, 2018  Content Version: 11.9  © 3530-4798 [employer]. Care instructions adapted under license by [employer] (which disclaims liability or warranty for this information). If you have questions about a medical condition or this instruction, always ask your healthcare professional. [address] any warranty or liability for your use of this information.

## 2019-03-27 NOTE — PATIENT INSTRUCTIONS
Cystoscopy: Care Instructions  Your Care Instructions  Cystoscopy is a test. It uses a thin, lighted tube called a cystoscope to see the inside of the bladder and the urethra. The urethra is the tube that carries urine out of the body. This test is helpful because it lets your doctor see areas of your bladder and urethra that are hard to see on X-rays. It can help your doctor find bladder stones, tumors, bleeding, and infection. During this test, your doctor also can take tissue and urine samples. And if your doctor finds small stones or growths, he or she can remove them. In most cases the scope is in the bladder for less than 10 minutes. But the entire test may take 45 minutes or longer. You will probably get local anesthesia. This numbs a small part of your body. Or you may get spinal anesthesia, which numbs more of your body. Once in a while, doctors use general anesthesia. It makes you sleep during surgery. If you get a local anesthetic, you may be able to get up right after the test. But if you had spinal or general anesthesia, you will stay in the recovery room until you are able to walk or you have feeling again in your lower body. This usually takes about an hour. Your doctor may be able to tell you some of the results right after the test. But the complete results may take several days. Follow-up care is a key part of your treatment and safety. Be sure to make and go to all appointments, and call your doctor if you are having problems. It's also a good idea to know your test results and keep a list of the medicines you take. How can you care for yourself at home? Before the test  · If you are having a local anesthetic, you can eat and drink before the test.  · If you are having a spinal or general anesthetic, do not eat or drink anything for at least 8 hours before the test. Tell your doctor what medicines you take.   · If you are not staying overnight in the hospital, make sure you have someone who can drive you home after the test.  After the test  · If your doctor prescribed antibiotics, take them as directed. Do not stop taking them just because you feel better. You need to take the full course of antibiotics. · You may have some burning when you urinate for a day or two after the test. You may feel better if you drink more fluids. This may also help prevent an infection. · Your urine may have a pinkish color for a few days after the test.  When should you call for help? Call your doctor now or seek immediate medical care if:    · Your urine is still red or you see blood clots after you have urinated several times.     · You cannot pass urine 8 hours after the test.     · You get a fever or chills.     · You have pain in your belly or your back just below your rib cage. This is also called flank pain.    Watch closely for changes in your health, and be sure to contact your doctor if:    · You have pain or burning when you urinate. A burning sensation is normal for a day or two after the test. But call if it does not get better.     · You have a frequent urge to urinate but can pass only small amounts of urine.     · Your urine is pink, red, or cloudy or smells bad. It is normal for the urine to have a pinkish color for a few days after the test. But call if it does not get better.     · You do not get better as expected. Where can you learn more? Go to http://tu-angela.info/. Enter S512 in the search box to learn more about \"Cystoscopy: Care Instructions. \"  Current as of: March 21, 2018  Content Version: 11.8  © 8072-3079 [employer]. Care instructions adapted under license by [employer] (which disclaims liability or warranty for this information).  If you have questions about a medical condition or this instruction, always ask your healthcare professional. [address] any warranty or liability for your use of this 27-Mar-2019 01:32 information.

## 2019-03-30 ENCOUNTER — HOSPITAL ENCOUNTER (OUTPATIENT)
Age: 84
Discharge: HOME | End: 2019-03-30
Attending: PHYSICIAN ASSISTANT
Payer: MEDICARE

## 2019-03-30 DIAGNOSIS — M54.31 SCIATICA OF RIGHT SIDE: ICD-10-CM

## 2019-03-30 DIAGNOSIS — M47.816 LUMBAR SPONDYLOSIS: ICD-10-CM

## 2019-03-30 DIAGNOSIS — M54.32 SCIATICA OF LEFT SIDE: ICD-10-CM

## 2019-03-30 PROCEDURE — 72148 MRI LUMBAR SPINE W/O DYE: CPT

## 2019-07-09 RX ORDER — TAMSULOSIN HYDROCHLORIDE 0.4 MG/1
CAPSULE ORAL
Qty: 90 CAP | Refills: 3 | Status: SHIPPED | OUTPATIENT
Start: 2019-07-09 | End: 2020-09-17 | Stop reason: SDUPTHER

## 2019-09-19 ENCOUNTER — OFFICE VISIT (OUTPATIENT)
Dept: UROLOGY | Age: 84
End: 2019-09-19

## 2019-09-19 VITALS
HEIGHT: 68 IN | HEART RATE: 72 BPM | OXYGEN SATURATION: 96 % | SYSTOLIC BLOOD PRESSURE: 90 MMHG | BODY MASS INDEX: 26.15 KG/M2 | DIASTOLIC BLOOD PRESSURE: 60 MMHG

## 2019-09-19 DIAGNOSIS — N13.8 BPH WITH OBSTRUCTION/LOWER URINARY TRACT SYMPTOMS: Primary | ICD-10-CM

## 2019-09-19 DIAGNOSIS — R33.9 URINARY RETENTION: ICD-10-CM

## 2019-09-19 DIAGNOSIS — N40.1 BPH WITH OBSTRUCTION/LOWER URINARY TRACT SYMPTOMS: Primary | ICD-10-CM

## 2019-09-19 LAB
BILIRUB UR QL STRIP: NEGATIVE
GLUCOSE UR-MCNC: NEGATIVE MG/DL
KETONES P FAST UR STRIP-MCNC: NEGATIVE MG/DL
PH UR STRIP: 5.5 [PH] (ref 4.6–8)
PROT UR QL STRIP: NORMAL
SP GR UR STRIP: 1.01 (ref 1–1.03)
UA UROBILINOGEN AMB POC: NORMAL (ref 0.2–1)
URINALYSIS CLARITY POC: NORMAL
URINALYSIS COLOR POC: NORMAL
URINE BLOOD POC: NORMAL
URINE LEUKOCYTES POC: NORMAL
URINE NITRITES POC: NEGATIVE

## 2019-09-19 RX ORDER — TAMSULOSIN HYDROCHLORIDE 0.4 MG/1
0.4 CAPSULE ORAL DAILY
Qty: 90 CAP | Refills: 3 | Status: SHIPPED | OUTPATIENT
Start: 2019-09-19 | End: 2020-09-17 | Stop reason: SDUPTHER

## 2019-09-19 RX ORDER — GABAPENTIN 100 MG/1
CAPSULE ORAL
COMMUNITY
Start: 2019-07-22

## 2019-09-19 NOTE — PATIENT INSTRUCTIONS
Benign Prostatic Hyperplasia: Care Instructions  Your Care Instructions    Benign prostatic hyperplasia, or BPH, is an enlarged prostate gland. The prostate is a small gland that makes some of the fluid in semen. Prostate enlargement happens to almost all men as they age. It is usually not serious. BPH does not cause prostate cancer. As the prostate gets bigger, it may partly block the flow of urine. You may have a hard time getting a urine stream started or completely stopped. BPH can cause dribbling. You may have a weak urine stream, or you may have to urinate more often than you used to, especially at night. Most men find these problems easy to manage. You do not need treatment unless your symptoms bother you a lot or you have other problems, such as bladder infections or stones. In these cases, medicines may help. Surgery is not needed unless the urine flow is blocked or the symptoms do not get better with medicine. Follow-up care is a key part of your treatment and safety. Be sure to make and go to all appointments, and call your doctor if you are having problems. It's also a good idea to know your test results and keep a list of the medicines you take. How can you care for yourself at home? · Take plenty of time to urinate. Try to relax. · Try \"double voiding. \" Urinate as much you can, relax for a few moments, and then try to urinate again. · Sit on the toilet to urinate. · Read or think of other things while you are waiting. · Turn on a faucet, or try to picture running water. Some men find that this helps get their urine flowing. · If dribbling is a problem, wash your penis daily to avoid skin irritation and infection. · Avoid caffeine and alcohol. These drinks will increase how often you need to urinate. Spread your fluid intake throughout the day. If the urge to urinate often wakes you at night, limit your fluid intake in the evening. Urinate right before you go to bed.   · Many over-the-counter cold and allergy medicines can make the symptoms of BPH worse. Avoid antihistamines, decongestants, and allergy pills, if you can. Read the warnings on the package. · If you take any prescription medicines, especially tranquilizers or antidepressants, ask your doctor or pharmacist whether they can cause urination problems. There may be other medicines you can use that do not cause urinary problems. · Be safe with medicines. Take your medicines exactly as prescribed. Call your doctor if you think you are having a problem with your medicine. When should you call for help? Call your doctor now or seek immediate medical care if:    · You cannot urinate at all.     · You have symptoms of a urinary infection. For example:  ? You have blood or pus in your urine. ? You have pain in your back just below your rib cage. This is called flank pain. ? You have a fever, chills, or body aches. ? It hurts to urinate. ? You have groin or belly pain.    Watch closely for changes in your health, and be sure to contact your doctor if:    · It hurts when you ejaculate.     · Your urinary problems get a lot worse or bother you a lot. Where can you learn more? Go to http://tu-angela.info/. Enter Q459 in the search box to learn more about \"Benign Prostatic Hyperplasia: Care Instructions. \"  Current as of: September 26, 2018  Content Version: 12.1  © 1975-7358 [employer]. Care instructions adapted under license by [employer] (which disclaims liability or warranty for this information). If you have questions about a medical condition or this instruction, always ask your healthcare professional. [address] any warranty or liability for your use of this information.

## 2019-09-19 NOTE — PROGRESS NOTES
Jamaalmanpreet Sanaz 80 y.o. male     Mr. 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  There are no strictures, stones, tumors, or diverticuli. Robert Coffer is marked trabeculation with extensive cellule formation but no bladder lining outpouching.  The prostatic channel is well excavated especially the bladder neck region.  Ureteral orifices are both slitlike in appearance with clear urine jets observed from both sides.  There is marked injection and friability of blood vessels in the bladder outlet and prostatic channel with active bleeding on touching with the tip of the cystoscope.      TURP pathology 19 May 2017 MS 17-2/9/2005 19 g of tissue benign histology         PVR 5 71 cc on for May 2017  PVR  210 in June 2017   in December 2017   cc in September 2018   cc in March 2019   cc on 19 September 2019      PSA 10.0 on 4 May 2017  PSA 1.8 in September 2018  PSA 4.0 on 15 March 2019         Review of Systems:   CNS: No seizures syncope headaches dizziness or visual changes  Respiratory: No wheezing no shortness of breath no coughing-  Cardiovascular: No chest pains no palpitations  Intestinal: No dyspepsia diarrhea or constipation  Urinary: Irritable bladder symptoms for several years  Skeletal: Large joint arthritis  Endocrine: No diabetes or thyroid disease   other:     Allergies: Allergies   Allergen Reactions    Lyrica [Pregabalin] Swelling    Clindamycin Other (comments)     C. Diff      Medications:    Current Outpatient Medications   Medication Sig Dispense Refill    gabapentin (NEURONTIN) 100 mg capsule       tamsulosin (FLOMAX) 0.4 mg capsule TAKE 1 CAPSULE DAILY 90 Cap 3    simvastatin (ZOCOR) 20 mg tablet Take  by mouth nightly.  cholecalciferol, vitamin D3, 2,000 unit tab Take  by Mouth.  OMEGA-3 FATTY ACIDS-FISH OIL PO 1,000 mg.      fluticasone (FLONASE) 50 mcg/actuation nasal spray       finasteride (PROSCAR) 5 mg tablet Take 1 Tab by mouth daily.  Indications: benign prostatic hyperplasia with lower urinary tract sx 90 Tab 3       Past Medical History:   Diagnosis Date    BPH (benign prostatic hyperplasia)     History of shingles     rt arm/neck    Hyperlipidemia       Past Surgical History:   Procedure Laterality Date    HX CATARACT REMOVAL Bilateral 2016    dec/2016-left eye, -right eye    HX CYST REMOVAL      HX MOHS PROCEDURES  1980s    left ear    HX TURP  2017    dizzy episodes post op 2-3 wks     Social History     Socioeconomic History    Marital status:      Spouse name: Not on file    Number of children: Not on file    Years of education: Not on file    Highest education level: Not on file   Occupational History    Not on file   Social Needs    Financial resource strain: Not on file    Food insecurity:     Worry: Not on file     Inability: Not on file    Transportation needs:     Medical: Not on file     Non-medical: Not on file   Tobacco Use    Smoking status: Former Smoker     Last attempt to quit: 1960     Years since quittin.7    Smokeless tobacco: Never Used   Substance and Sexual Activity    Alcohol use: No    Drug use: No    Sexual activity: Not on file   Lifestyle    Physical activity:     Days per week: Not on file     Minutes per session: Not on file  Stress: Not on file   Relationships    Social connections:     Talks on phone: Not on file     Gets together: Not on file     Attends [employer] service: Not on file     Active member of club or organization: Not on file     Attends meetings of clubs or organizations: Not on file     Relationship status: Not on file    Intimate partner violence:     Fear of current or ex partner: Not on file     Emotionally abused: Not on file     Physically abused: Not on file     Forced sexual activity: Not on file   Other Topics Concern    Not on file   Social History Narrative    Not on file      Family History   Problem Relation Age of Onset    Diabetes Father     Diabetes Brother     Heart Attack Neg Hx     Stroke Neg Hx         Physical Examination: Well-nourished trim and fit appearing elderly male in no apparent distress ambulating with assistance of wheeled 4 posted aluminum walker      Urinalysis: Negative dipstick/nitrite negative/++heme      PVR today 259 cc        Impression: Symptomatic BPH stable status post TURP for retention with mild to moderate chronic urinary retention postoperatively        Plan: PVR surveillance            Continue Flomax 0.4 mg daily    RTC 6 months PVR        Franky eLe MD  -electronically signed-    PLEASE NOTE:  This document has been produced using voice recognition software. Unrecognized errors in transcription may be present.

## 2019-09-19 NOTE — PROGRESS NOTES
Mr. Clayton Ocampo has a reminder for a \"due or due soon\" health maintenance. I have asked that he contact his primary care provider for follow-up on this health maintenance.

## 2020-12-31 ENCOUNTER — APPOINTMENT (OUTPATIENT)
Dept: CT IMAGING | Age: 85
End: 2020-12-31
Attending: PHYSICIAN ASSISTANT
Payer: MEDICARE

## 2020-12-31 ENCOUNTER — APPOINTMENT (OUTPATIENT)
Dept: CT IMAGING | Age: 85
End: 2020-12-31
Attending: EMERGENCY MEDICINE
Payer: MEDICARE

## 2020-12-31 ENCOUNTER — APPOINTMENT (OUTPATIENT)
Dept: GENERAL RADIOLOGY | Age: 85
End: 2020-12-31
Attending: PHYSICIAN ASSISTANT
Payer: MEDICARE

## 2020-12-31 ENCOUNTER — HOSPITAL ENCOUNTER (OUTPATIENT)
Age: 85
Setting detail: OBSERVATION
Discharge: HOME | End: 2021-01-01
Attending: EMERGENCY MEDICINE | Admitting: INTERNAL MEDICINE
Payer: MEDICARE

## 2020-12-31 DIAGNOSIS — G45.9 TIA (TRANSIENT ISCHEMIC ATTACK): Primary | ICD-10-CM

## 2020-12-31 LAB
ANION GAP SERPL CALC-SCNC: 3 MMOL/L (ref 3–18)
APPEARANCE UR: ABNORMAL
ATRIAL RATE: 63 BPM
BACTERIA URNS QL MICRO: ABNORMAL /HPF
BASOPHILS # BLD: 0 K/UL (ref 0–0.1)
BASOPHILS NFR BLD: 0 % (ref 0–2)
BILIRUB UR QL: NEGATIVE
BUN SERPL-MCNC: 20 MG/DL (ref 7–18)
BUN/CREAT SERPL: 22 (ref 12–20)
CALCIUM SERPL-MCNC: 8.6 MG/DL (ref 8.5–10.1)
CALCULATED P AXIS, ECG09: 57 DEGREES
CALCULATED R AXIS, ECG10: 46 DEGREES
CALCULATED T AXIS, ECG11: 79 DEGREES
CHLORIDE SERPL-SCNC: 103 MMOL/L (ref 100–111)
CO2 SERPL-SCNC: 29 MMOL/L (ref 21–32)
COLOR UR: YELLOW
CREAT SERPL-MCNC: 0.91 MG/DL (ref 0.6–1.3)
DIAGNOSIS, 93000: NORMAL
DIFFERENTIAL METHOD BLD: ABNORMAL
EOSINOPHIL # BLD: 0.1 K/UL (ref 0–0.4)
EOSINOPHIL NFR BLD: 1 % (ref 0–5)
EPITH CASTS URNS QL MICRO: NEGATIVE /LPF (ref 0–5)
ERYTHROCYTE [DISTWIDTH] IN BLOOD BY AUTOMATED COUNT: 13.5 % (ref 11.6–14.5)
EST. AVERAGE GLUCOSE BLD GHB EST-MCNC: 111 MG/DL
GLUCOSE BLD STRIP.AUTO-MCNC: 111 MG/DL (ref 70–110)
GLUCOSE BLD STRIP.AUTO-MCNC: 99 MG/DL (ref 70–110)
GLUCOSE SERPL-MCNC: 112 MG/DL (ref 74–99)
GLUCOSE UR STRIP.AUTO-MCNC: NEGATIVE MG/DL
HBA1C MFR BLD: 5.5 % (ref 4.2–5.6)
HCT VFR BLD AUTO: 41 % (ref 36–48)
HGB BLD-MCNC: 13.4 G/DL (ref 13–16)
HGB UR QL STRIP: ABNORMAL
INR PPP: 1 (ref 0.8–1.2)
KETONES UR QL STRIP.AUTO: NEGATIVE MG/DL
LEUKOCYTE ESTERASE UR QL STRIP.AUTO: ABNORMAL
LYMPHOCYTES # BLD: 1.2 K/UL (ref 0.9–3.6)
LYMPHOCYTES NFR BLD: 20 % (ref 21–52)
MCH RBC QN AUTO: 29.2 PG (ref 24–34)
MCHC RBC AUTO-ENTMCNC: 32.7 G/DL (ref 31–37)
MCV RBC AUTO: 89.3 FL (ref 74–97)
MONOCYTES # BLD: 0.3 K/UL (ref 0.05–1.2)
MONOCYTES NFR BLD: 5 % (ref 3–10)
NEUTS SEG # BLD: 4.4 K/UL (ref 1.8–8)
NEUTS SEG NFR BLD: 74 % (ref 40–73)
NITRITE UR QL STRIP.AUTO: NEGATIVE
P-R INTERVAL, ECG05: 186 MS
PH UR STRIP: 7 [PH] (ref 5–8)
PLATELET # BLD AUTO: 163 K/UL (ref 135–420)
PMV BLD AUTO: 10.6 FL (ref 9.2–11.8)
POTASSIUM SERPL-SCNC: 4.4 MMOL/L (ref 3.5–5.5)
PROT UR STRIP-MCNC: ABNORMAL MG/DL
PROTHROMBIN TIME: 13.2 SEC (ref 11.5–15.2)
Q-T INTERVAL, ECG07: 384 MS
QRS DURATION, ECG06: 76 MS
QTC CALCULATION (BEZET), ECG08: 392 MS
RBC # BLD AUTO: 4.59 M/UL (ref 4.7–5.5)
RBC #/AREA URNS HPF: ABNORMAL /HPF (ref 0–5)
SODIUM SERPL-SCNC: 135 MMOL/L (ref 136–145)
SP GR UR REFRACTOMETRY: 1.02 (ref 1–1.03)
TROPONIN I SERPL-MCNC: <0.02 NG/ML (ref 0–0.04)
UROBILINOGEN UR QL STRIP.AUTO: 0.2 EU/DL (ref 0.2–1)
VENTRICULAR RATE, ECG03: 63 BPM
WBC # BLD AUTO: 5.9 K/UL (ref 4.6–13.2)
WBC URNS QL MICRO: ABNORMAL /HPF (ref 0–4)

## 2020-12-31 PROCEDURE — 85025 COMPLETE CBC W/AUTO DIFF WBC: CPT

## 2020-12-31 PROCEDURE — 80048 BASIC METABOLIC PNL TOTAL CA: CPT

## 2020-12-31 PROCEDURE — 99285 EMERGENCY DEPT VISIT HI MDM: CPT

## 2020-12-31 PROCEDURE — 2709999900 HC NON-CHARGEABLE SUPPLY

## 2020-12-31 PROCEDURE — 87086 URINE CULTURE/COLONY COUNT: CPT

## 2020-12-31 PROCEDURE — 74011250637 HC RX REV CODE- 250/637: Performed by: INTERNAL MEDICINE

## 2020-12-31 PROCEDURE — 81001 URINALYSIS AUTO W/SCOPE: CPT

## 2020-12-31 PROCEDURE — 74011000250 HC RX REV CODE- 250: Performed by: NURSE PRACTITIONER

## 2020-12-31 PROCEDURE — 96372 THER/PROPH/DIAG INJ SC/IM: CPT

## 2020-12-31 PROCEDURE — 71045 X-RAY EXAM CHEST 1 VIEW: CPT

## 2020-12-31 PROCEDURE — 87077 CULTURE AEROBIC IDENTIFY: CPT

## 2020-12-31 PROCEDURE — 96374 THER/PROPH/DIAG INJ IV PUSH: CPT

## 2020-12-31 PROCEDURE — 74011000636 HC RX REV CODE- 636: Performed by: EMERGENCY MEDICINE

## 2020-12-31 PROCEDURE — 74011250636 HC RX REV CODE- 250/636: Performed by: NURSE PRACTITIONER

## 2020-12-31 PROCEDURE — 85610 PROTHROMBIN TIME: CPT

## 2020-12-31 PROCEDURE — 93005 ELECTROCARDIOGRAM TRACING: CPT

## 2020-12-31 PROCEDURE — 82962 GLUCOSE BLOOD TEST: CPT

## 2020-12-31 PROCEDURE — 70450 CT HEAD/BRAIN W/O DYE: CPT

## 2020-12-31 PROCEDURE — 84484 ASSAY OF TROPONIN QUANT: CPT

## 2020-12-31 PROCEDURE — 99218 HC RM OBSERVATION: CPT

## 2020-12-31 PROCEDURE — 87186 SC STD MICRODIL/AGAR DIL: CPT

## 2020-12-31 PROCEDURE — 65660000000 HC RM CCU STEPDOWN

## 2020-12-31 PROCEDURE — 83036 HEMOGLOBIN GLYCOSYLATED A1C: CPT

## 2020-12-31 PROCEDURE — 74011250637 HC RX REV CODE- 250/637: Performed by: EMERGENCY MEDICINE

## 2020-12-31 PROCEDURE — 74011250636 HC RX REV CODE- 250/636: Performed by: INTERNAL MEDICINE

## 2020-12-31 PROCEDURE — 70498 CT ANGIOGRAPHY NECK: CPT

## 2020-12-31 RX ORDER — LABETALOL HCL 20 MG/4 ML
5 SYRINGE (ML) INTRAVENOUS
Status: DISCONTINUED | OUTPATIENT
Start: 2020-12-31 | End: 2021-01-01 | Stop reason: HOSPADM

## 2020-12-31 RX ORDER — SODIUM CHLORIDE 0.9 % (FLUSH) 0.9 %
5-40 SYRINGE (ML) INJECTION AS NEEDED
Status: DISCONTINUED | OUTPATIENT
Start: 2020-12-31 | End: 2021-01-01 | Stop reason: HOSPADM

## 2020-12-31 RX ORDER — ASPIRIN 81 MG/1
81 TABLET ORAL DAILY
Status: DISCONTINUED | OUTPATIENT
Start: 2021-01-01 | End: 2021-01-01 | Stop reason: HOSPADM

## 2020-12-31 RX ORDER — CLOPIDOGREL 300 MG/1
300 TABLET, FILM COATED ORAL
Status: COMPLETED | OUTPATIENT
Start: 2020-12-31 | End: 2020-12-31

## 2020-12-31 RX ORDER — FAMOTIDINE 20 MG/1
20 TABLET, FILM COATED ORAL DAILY
Status: DISCONTINUED | OUTPATIENT
Start: 2021-01-01 | End: 2021-01-01 | Stop reason: HOSPADM

## 2020-12-31 RX ORDER — ACETAMINOPHEN 650 MG/1
650 SUPPOSITORY RECTAL
Status: DISCONTINUED | OUTPATIENT
Start: 2020-12-31 | End: 2021-01-01 | Stop reason: HOSPADM

## 2020-12-31 RX ORDER — HEPARIN SODIUM 5000 [USP'U]/ML
5000 INJECTION, SOLUTION INTRAVENOUS; SUBCUTANEOUS EVERY 8 HOURS
Status: DISCONTINUED | OUTPATIENT
Start: 2020-12-31 | End: 2021-01-01 | Stop reason: HOSPADM

## 2020-12-31 RX ORDER — TAMSULOSIN HYDROCHLORIDE 0.4 MG/1
0.4 CAPSULE ORAL DAILY
Status: DISCONTINUED | OUTPATIENT
Start: 2021-01-01 | End: 2021-01-01 | Stop reason: HOSPADM

## 2020-12-31 RX ORDER — MELATONIN
2000 DAILY
Status: DISCONTINUED | OUTPATIENT
Start: 2021-01-01 | End: 2021-01-01 | Stop reason: HOSPADM

## 2020-12-31 RX ORDER — ACETAMINOPHEN 325 MG/1
650 TABLET ORAL
Status: DISCONTINUED | OUTPATIENT
Start: 2020-12-31 | End: 2021-01-01 | Stop reason: HOSPADM

## 2020-12-31 RX ORDER — ASPIRIN 325 MG
325 TABLET ORAL
Status: COMPLETED | OUTPATIENT
Start: 2020-12-31 | End: 2020-12-31

## 2020-12-31 RX ORDER — ONDANSETRON 2 MG/ML
4 INJECTION INTRAMUSCULAR; INTRAVENOUS
Status: DISCONTINUED | OUTPATIENT
Start: 2020-12-31 | End: 2021-01-01 | Stop reason: HOSPADM

## 2020-12-31 RX ORDER — SODIUM CHLORIDE 0.9 % (FLUSH) 0.9 %
5-40 SYRINGE (ML) INJECTION EVERY 8 HOURS
Status: DISCONTINUED | OUTPATIENT
Start: 2020-12-31 | End: 2021-01-01 | Stop reason: HOSPADM

## 2020-12-31 RX ORDER — ATORVASTATIN CALCIUM 40 MG/1
80 TABLET, FILM COATED ORAL
Status: DISCONTINUED | OUTPATIENT
Start: 2020-12-31 | End: 2021-01-01 | Stop reason: HOSPADM

## 2020-12-31 RX ORDER — CLOPIDOGREL BISULFATE 75 MG/1
75 TABLET ORAL DAILY
Status: DISCONTINUED | OUTPATIENT
Start: 2021-01-01 | End: 2021-01-01 | Stop reason: HOSPADM

## 2020-12-31 RX ORDER — POLYETHYLENE GLYCOL 3350 17 G/17G
17 POWDER, FOR SOLUTION ORAL DAILY PRN
Status: DISCONTINUED | OUTPATIENT
Start: 2020-12-31 | End: 2021-01-01 | Stop reason: HOSPADM

## 2020-12-31 RX ORDER — PROMETHAZINE HYDROCHLORIDE 25 MG/1
12.5 TABLET ORAL
Status: DISCONTINUED | OUTPATIENT
Start: 2020-12-31 | End: 2021-01-01 | Stop reason: HOSPADM

## 2020-12-31 RX ADMIN — Medication 10 ML: at 23:58

## 2020-12-31 RX ADMIN — ASPIRIN 325 MG ORAL TABLET 325 MG: 325 PILL ORAL at 13:52

## 2020-12-31 RX ADMIN — HEPARIN SODIUM 5000 UNITS: 5000 INJECTION INTRAVENOUS; SUBCUTANEOUS at 17:28

## 2020-12-31 RX ADMIN — CLOPIDOGREL BISULFATE 300 MG: 300 TABLET, FILM COATED ORAL at 13:51

## 2020-12-31 RX ADMIN — HEPARIN SODIUM 5000 UNITS: 5000 INJECTION INTRAVENOUS; SUBCUTANEOUS at 23:57

## 2020-12-31 RX ADMIN — IOPAMIDOL 80 ML: 755 INJECTION, SOLUTION INTRAVENOUS at 14:19

## 2020-12-31 RX ADMIN — ATORVASTATIN CALCIUM 80 MG: 40 TABLET, FILM COATED ORAL at 22:14

## 2020-12-31 RX ADMIN — CEFTRIAXONE SODIUM 1 G: 1 INJECTION, POWDER, FOR SOLUTION INTRAMUSCULAR; INTRAVENOUS at 22:13

## 2020-12-31 NOTE — ED NOTES
Pt educated on use for Heparin and signs to watch for. Pt verbalized understand. Pt provided with hospital bed and hospital socks. Belongings placed in property bag. Pt aware that waiting bed assignment from [facility]. Pt denies any needs from RN at this time.

## 2020-12-31 NOTE — CONSULTS
Spoke with Dr. Andreea Campa for patient admission and the need for further stroke workup. Currently, his symptom resolved and he remains stable. He has ASA and Plavix load. Since he is still awaiting for room in [address] [place] [address], stroke ordered placed for close monitor while he remains in [facility] ER. Will continue permissive hypertension and close monitor on telemetry. We will examine/evaluate him once he arrived to [address] [place] [address]. He will remain under [facility] ER care while remains in ER.        Steven Rogers MD

## 2020-12-31 NOTE — ED PROVIDER NOTES
60-year-old male presents for evaluation after episode of dysarthria and acute change in vision last evening. Patient was getting ready for bed at approximately 20 to 30 hours. He was looking down at a printed page of a block. He states that the right side of the page was completely blurry but he was able to see the left side of the page. Shortly thereafter he was attempting to say his peers out loud but could not get any of the words out. He also felt that he was having difficulty remembering his prayers that he normally would have no trouble reciting. He sat on the edge of the bed for a few minutes and after approximately 5 to 10 minutes symptoms resolved. No prior history of TIA or CVA. He woke up this morning he felt back to his baseline. Was able to take a shower and perform his normal activities of daily living without difficulty. No associated chest pain, shortness of breath. Notes a slight discomfort right frontal region has been intermittent for a few days that he thinks may be due to sinus congestion. He denies purulent drainage or fever.            Past Medical History:   Diagnosis Date    BPH (benign prostatic hyperplasia)     History of shingles 2007    rt arm/neck    Hyperlipidemia     Vasovagal episode        Past Surgical History:   Procedure Laterality Date    HX CATARACT REMOVAL Bilateral 12/2016    dec/2016-left eye, jan/2017-right eye    HX CYST REMOVAL  1947    HX MOHS PROCEDURES  1980s    left ear    HX TURP  07/2017    dizzy episodes post op 2-3 wks         Family History:   Problem Relation Age of Onset    Diabetes Father     Diabetes Brother     Heart Attack Neg Hx     Stroke Neg Hx        Social History     Socioeconomic History    Marital status: UNKNOWN     Spouse name: Not on file    Number of children: Not on file    Years of education: Not on file    Highest education level: Not on file   Occupational History    Not on file   Social Needs    Financial resource strain: Not on file    Food insecurity     Worry: Not on file     Inability: Not on file    Transportation needs     Medical: Not on file     Non-medical: Not on file   Tobacco Use    Smoking status: Former Smoker     Quit date: 1960     Years since quittin.0    Smokeless tobacco: Never Used   Substance and Sexual Activity    Alcohol use: No    Drug use: No    Sexual activity: Not on file   Lifestyle    Physical activity     Days per week: Not on file     Minutes per session: Not on file    Stress: Not on file   Relationships    Social connections     Talks on phone: Not on file     Gets together: Not on file     Attends [employer]ism service: Not on file     Active member of club or organization: Not on file     Attends meetings of clubs or organizations: Not on file     Relationship status: Not on file    Intimate partner violence     Fear of current or ex partner: Not on file     Emotionally abused: Not on file     Physically abused: Not on file     Forced sexual activity: Not on file   Other Topics Concern    Not on file   Social History Narrative    Not on file         ALLERGIES: Lyrica [pregabalin] and Clindamycin    Review of Systems   Eyes: Positive for visual disturbance. Neurological: Positive for speech difficulty. Negative for headaches. All other systems reviewed and are negative. Vitals:    20 1230 20 1300 20 1330   BP: (!) 144/54 (!) 155/70 (!) 142/97   Pulse: 78 64 63   Resp: 18 12 13   Temp: 97.7 °F (36.5 °C)     SpO2: 100% 99% 100%   Weight: 70.3 kg (155 lb)     Height: 5' 8\" (1.727 m)              Physical Exam  Vitals signs and nursing note reviewed. Constitutional:       General: He is not in acute distress. Appearance: He is well-developed. He is not diaphoretic. HENT:      Head: Normocephalic and atraumatic. Nose: Nose normal.   Eyes:      General: No scleral icterus. Right eye: No discharge.          Left eye: No discharge. Neck:      Musculoskeletal: Neck supple. Vascular: No JVD. Cardiovascular:      Rate and Rhythm: Normal rate and regular rhythm. Heart sounds: Normal heart sounds. No murmur. No friction rub. No gallop. Pulmonary:      Effort: Pulmonary effort is normal. No respiratory distress. Breath sounds: Normal breath sounds. No wheezing or rales. Abdominal:      Palpations: Abdomen is soft. Tenderness: There is no abdominal tenderness. There is no guarding or rebound. Musculoskeletal:         General: No tenderness. Skin:     General: Skin is warm and dry. Findings: No rash. Neurological:      Mental Status: He is alert and oriented to person, place, and time. Motor: No abnormal muscle tone. Coordination: Coordination normal.      Comments: Alert and oriented x3. No facial droop. No dysarthria.  strength equal and symmetric. No pronator drift. Intact finger-to-nose bilaterally. Current stroke score 0. Recent Results (from the past 12 hour(s))   GLUCOSE, POC    Collection Time: 12/31/20 12:41 PM   Result Value Ref Range    Glucose (POC) 111 (H) 70 - 110 mg/dL   CBC WITH AUTOMATED DIFF    Collection Time: 12/31/20 12:45 PM   Result Value Ref Range    WBC 5.9 4.6 - 13.2 K/uL    RBC 4.59 (L) 4.70 - 5.50 M/uL    HGB 13.4 13.0 - 16.0 g/dL    HCT 41.0 36.0 - 48.0 %    MCV 89.3 74.0 - 97.0 FL    MCH 29.2 24.0 - 34.0 PG    MCHC 32.7 31.0 - 37.0 g/dL    RDW 13.5 11.6 - 14.5 %    PLATELET 843 116 - 290 K/uL    MPV 10.6 9.2 - 11.8 FL    NEUTROPHILS 74 (H) 40 - 73 %    LYMPHOCYTES 20 (L) 21 - 52 %    MONOCYTES 5 3 - 10 %    EOSINOPHILS 1 0 - 5 %    BASOPHILS 0 0 - 2 %    ABS. NEUTROPHILS 4.4 1.8 - 8.0 K/UL    ABS. LYMPHOCYTES 1.2 0.9 - 3.6 K/UL    ABS. MONOCYTES 0.3 0.05 - 1.2 K/UL    ABS. EOSINOPHILS 0.1 0.0 - 0.4 K/UL    ABS.  BASOPHILS 0.0 0.0 - 0.1 K/UL    DF AUTOMATED     METABOLIC PANEL, BASIC    Collection Time: 12/31/20 12:45 PM   Result Value Ref Range Sodium 135 (L) 136 - 145 mmol/L    Potassium 4.4 3.5 - 5.5 mmol/L    Chloride 103 100 - 111 mmol/L    CO2 29 21 - 32 mmol/L    Anion gap 3 3.0 - 18 mmol/L    Glucose 112 (H) 74 - 99 mg/dL    BUN 20 (H) 7.0 - 18 MG/DL    Creatinine 0.91 0.6 - 1.3 MG/DL    BUN/Creatinine ratio 22 (H) 12 - 20      GFR est AA >60 >60 ml/min/1.73m2    GFR est non-AA >60 >60 ml/min/1.73m2    Calcium 8.6 8.5 - 10.1 MG/DL   TROPONIN I    Collection Time: 12/31/20 12:45 PM   Result Value Ref Range    Troponin-I, QT <0.02 0.0 - 0.045 NG/ML   PROTHROMBIN TIME + INR    Collection Time: 12/31/20 12:45 PM   Result Value Ref Range    Prothrombin time 13.2 11.5 - 15.2 sec    INR 1.0 0.8 - 1.2     EKG, 12 LEAD, INITIAL    Collection Time: 12/31/20 12:53 PM   Result Value Ref Range    Ventricular Rate 63 BPM    Atrial Rate 63 BPM    P-R Interval 186 ms    QRS Duration 76 ms    Q-T Interval 384 ms    QTC Calculation (Bezet) 392 ms    Calculated P Axis 57 degrees    Calculated R Axis 46 degrees    Calculated T Axis 79 degrees    Diagnosis       Normal sinus rhythm  Normal ECG  When compared with ECG of 12-MAY-2017 11:41,  No significant change was found  Confirmed by Luis Madden M.D., Dena ([id number]) on 12/31/2020 2:18:38 PM       XR CHEST PORT   Final Result   IMPRESSION:   1. Hypoinflated lungs. 2.  No radiographic evidence of acute cardiopulmonary process. CT HEAD WO CONT   Final Result   IMPRESSION:        No acute findings to explain patient's symptoms, though MR is more sensitive for   acute stroke evaluation. Mild generalized volume loss. Results discussed with Dr. Hubert Lewis on 12/31/2020 at 1255 hours per stroke alert   protocol. CTA HEAD NECK W CONT    (Results Pending)     EKG reviewed at 1256 hrs. Normal sinus rhythm, rate 63. Normal intervals and axis without ischemic change. Impression normal EKG.       MDM  Number of Diagnoses or Management Options  TIA (transient ischemic attack)  Diagnosis management comments: Impression: Symptoms consistent with TIA with transient dysarthria and acute change in vision. Symptoms all now resolved. Screening head CT negative. Stroke alert called upon patient arrival.  Case discussed with Dr. Kendell Vargas of teleneurology. Recommends Plavix and aspirin. Patient treated with 325 mg of aspirin and 300 mg of Plavix orally. CTA head and neck currently pending. Case discussed with Dr. Emerson Silva, hospitalist.  Usual and customary discussion of patient past history, presenting symptoms, ED evaluation, consultants recommendations reviewed. Will admit to complete stroke work-up to include MRI and echocardiogram.         Procedures    Diagnosis:   1. TIA (transient ischemic attack)          Disposition: admit    Follow-up Information    None         Patient's Medications   Start Taking    No medications on file   Continue Taking    CHOLECALCIFEROL, VITAMIN D3, 2,000 UNIT TAB    Take  by Mouth. FINASTERIDE (PROSCAR) 5 MG TABLET    Take 1 Tab by mouth daily. Indications: benign prostatic hyperplasia with lower urinary tract sx    FLUTICASONE (FLONASE) 50 MCG/ACTUATION NASAL SPRAY        GABAPENTIN (NEURONTIN) 100 MG CAPSULE        OMEGA-3 FATTY ACIDS-FISH OIL PO    1,000 mg. SIMVASTATIN (ZOCOR) 20 MG TABLET    Take  by mouth nightly. TAMSULOSIN (FLOMAX) 0.4 MG CAPSULE    TAKE 1 CAPSULE DAILY    TRIMETHOPRIM-SULFAMETHOXAZOLE (BACTRIM DS, SEPTRA DS) 160-800 MG PER TABLET    Take 1 Tab by mouth two (2) times a day.    These Medications have changed    No medications on file   Stop Taking    No medications on file

## 2020-12-31 NOTE — ED TRIAGE NOTES
Patient had episode last night where he was reading his prayer book, and right side of page became blurred. Decided he would recite prayer without reading, but \"words would not come out\".

## 2021-01-01 ENCOUNTER — APPOINTMENT (OUTPATIENT)
Dept: MRI IMAGING | Age: 86
End: 2021-01-01
Attending: INTERNAL MEDICINE
Payer: MEDICARE

## 2021-01-01 ENCOUNTER — APPOINTMENT (OUTPATIENT)
Dept: NON INVASIVE DIAGNOSTICS | Age: 86
End: 2021-01-01
Attending: INTERNAL MEDICINE
Payer: MEDICARE

## 2021-01-01 VITALS
RESPIRATION RATE: 18 BRPM | HEART RATE: 66 BPM | HEIGHT: 68 IN | BODY MASS INDEX: 23.49 KG/M2 | SYSTOLIC BLOOD PRESSURE: 127 MMHG | WEIGHT: 155 LBS | DIASTOLIC BLOOD PRESSURE: 70 MMHG | OXYGEN SATURATION: 100 % | TEMPERATURE: 98 F

## 2021-01-01 LAB
ALBUMIN SERPL-MCNC: 3.1 G/DL (ref 3.4–5)
ALBUMIN/GLOB SERPL: 0.9 {RATIO} (ref 0.8–1.7)
ALP SERPL-CCNC: 49 U/L (ref 45–117)
ALT SERPL-CCNC: 17 U/L (ref 16–61)
ANION GAP SERPL CALC-SCNC: 6 MMOL/L (ref 3–18)
AST SERPL-CCNC: 9 U/L (ref 10–38)
BILIRUB DIRECT SERPL-MCNC: 0.1 MG/DL (ref 0–0.2)
BILIRUB SERPL-MCNC: 0.4 MG/DL (ref 0.2–1)
BUN SERPL-MCNC: 16 MG/DL (ref 7–18)
BUN/CREAT SERPL: 23 (ref 12–20)
CALCIUM SERPL-MCNC: 9.2 MG/DL (ref 8.5–10.1)
CHLORIDE SERPL-SCNC: 108 MMOL/L (ref 100–111)
CHOLEST SERPL-MCNC: 158 MG/DL
CO2 SERPL-SCNC: 27 MMOL/L (ref 21–32)
CREAT SERPL-MCNC: 0.71 MG/DL (ref 0.6–1.3)
ECHO AO ROOT DIAM: 3.32 CM
ECHO LA AREA 4C: 14.14 CM2
ECHO LA VOL 2C: 63.3 ML (ref 18–58)
ECHO LA VOL 4C: 31.49 ML (ref 18–58)
ECHO LA VOL BP: 49.71 ML (ref 18–58)
ECHO LA VOL/BSA BIPLANE: 27.1 ML/M2 (ref 16–28)
ECHO LA VOLUME INDEX A2C: 34.51 ML/M2 (ref 16–28)
ECHO LA VOLUME INDEX A4C: 17.17 ML/M2 (ref 16–28)
ECHO LV E' LATERAL VELOCITY: 8.44 CM/S
ECHO LV E' SEPTAL VELOCITY: 7.51 CM/S
ECHO LV INTERNAL DIMENSION DIASTOLIC: 2.94 CM (ref 4.2–5.9)
ECHO LV INTERNAL DIMENSION SYSTOLIC: 2.52 CM
ECHO LV IVSD: 1.37 CM (ref 0.6–1)
ECHO LV MASS 2D: 126.4 G (ref 88–224)
ECHO LV MASS INDEX 2D: 68.9 G/M2 (ref 49–115)
ECHO LV POSTERIOR WALL DIASTOLIC: 1.3 CM (ref 0.6–1)
ECHO LVOT DIAM: 1.8 CM
ECHO MV A VELOCITY: 108.23 CM/S
ECHO MV E DECELERATION TIME (DT): 213.88 MS
ECHO MV E VELOCITY: 86.96 CM/S
ECHO MV E/A RATIO: 0.8
ECHO MV E/E' LATERAL: 10.3
ECHO MV E/E' RATIO (AVERAGED): 10.94
ECHO MV E/E' SEPTAL: 11.58
ECHO MV MAX VELOCITY: 103.03 CM/S
ECHO MV MEAN GRADIENT: 1.44 MMHG
ECHO MV PEAK GRADIENT: 4.25 MMHG
ECHO MV PEAK GRADIENT: 4.25 MMHG
ECHO MV VTI: 32.19 CM
ECHO RV TAPSE: 2.36 CM (ref 1.5–2)
ECHO TV REGURGITANT MAX VELOCITY: 131.52 CM/S
ECHO TV REGURGITANT PEAK GRADIENT: 6.92 MMHG
ERYTHROCYTE [DISTWIDTH] IN BLOOD BY AUTOMATED COUNT: 13.5 % (ref 11.6–14.5)
ERYTHROCYTE [SEDIMENTATION RATE] IN BLOOD: 8 MM/HR (ref 0–20)
EST. AVERAGE GLUCOSE BLD GHB EST-MCNC: 111 MG/DL
GLOBULIN SER CALC-MCNC: 3.3 G/DL (ref 2–4)
GLUCOSE BLD STRIP.AUTO-MCNC: 89 MG/DL (ref 70–110)
GLUCOSE BLD STRIP.AUTO-MCNC: 97 MG/DL (ref 70–110)
GLUCOSE SERPL-MCNC: 80 MG/DL (ref 74–99)
HBA1C MFR BLD: 5.5 % (ref 4.2–5.6)
HCT VFR BLD AUTO: 37 % (ref 36–48)
HDLC SERPL-MCNC: 56 MG/DL (ref 40–60)
HDLC SERPL: 2.8 {RATIO} (ref 0–5)
HGB BLD-MCNC: 12.4 G/DL (ref 13–16)
LDLC SERPL CALC-MCNC: 87.8 MG/DL (ref 0–100)
LIPID PROFILE,FLP: NORMAL
MCH RBC QN AUTO: 29 PG (ref 24–34)
MCHC RBC AUTO-ENTMCNC: 33.5 G/DL (ref 31–37)
MCV RBC AUTO: 86.7 FL (ref 74–97)
PLATELET # BLD AUTO: 166 K/UL (ref 135–420)
PMV BLD AUTO: 10.7 FL (ref 9.2–11.8)
POTASSIUM SERPL-SCNC: 3.9 MMOL/L (ref 3.5–5.5)
PROT SERPL-MCNC: 6.4 G/DL (ref 6.4–8.2)
RBC # BLD AUTO: 4.27 M/UL (ref 4.7–5.5)
SODIUM SERPL-SCNC: 141 MMOL/L (ref 136–145)
TRIGL SERPL-MCNC: 71 MG/DL (ref ?–150)
TSH SERPL DL<=0.05 MIU/L-ACNC: 1.61 UIU/ML (ref 0.36–3.74)
VLDLC SERPL CALC-MCNC: 14.2 MG/DL
WBC # BLD AUTO: 6 K/UL (ref 4.6–13.2)

## 2021-01-01 PROCEDURE — 80061 LIPID PANEL: CPT

## 2021-01-01 PROCEDURE — 80048 BASIC METABOLIC PNL TOTAL CA: CPT

## 2021-01-01 PROCEDURE — 99218 HC RM OBSERVATION: CPT

## 2021-01-01 PROCEDURE — 36415 COLL VENOUS BLD VENIPUNCTURE: CPT

## 2021-01-01 PROCEDURE — 80076 HEPATIC FUNCTION PANEL: CPT

## 2021-01-01 PROCEDURE — 74011250637 HC RX REV CODE- 250/637: Performed by: INTERNAL MEDICINE

## 2021-01-01 PROCEDURE — 83036 HEMOGLOBIN GLYCOSYLATED A1C: CPT

## 2021-01-01 PROCEDURE — 2709999900 HC NON-CHARGEABLE SUPPLY

## 2021-01-01 PROCEDURE — 96372 THER/PROPH/DIAG INJ SC/IM: CPT

## 2021-01-01 PROCEDURE — 70551 MRI BRAIN STEM W/O DYE: CPT

## 2021-01-01 PROCEDURE — 97535 SELF CARE MNGMENT TRAINING: CPT

## 2021-01-01 PROCEDURE — 84443 ASSAY THYROID STIM HORMONE: CPT

## 2021-01-01 PROCEDURE — 97161 PT EVAL LOW COMPLEX 20 MIN: CPT

## 2021-01-01 PROCEDURE — 74011250637 HC RX REV CODE- 250/637: Performed by: NURSE PRACTITIONER

## 2021-01-01 PROCEDURE — 99239 HOSP IP/OBS DSCHRG MGMT >30: CPT | Performed by: INTERNAL MEDICINE

## 2021-01-01 PROCEDURE — 82962 GLUCOSE BLOOD TEST: CPT

## 2021-01-01 PROCEDURE — 85652 RBC SED RATE AUTOMATED: CPT

## 2021-01-01 PROCEDURE — 99233 SBSQ HOSP IP/OBS HIGH 50: CPT | Performed by: PSYCHIATRY & NEUROLOGY

## 2021-01-01 PROCEDURE — 93306 TTE W/DOPPLER COMPLETE: CPT

## 2021-01-01 PROCEDURE — 74011250636 HC RX REV CODE- 250/636: Performed by: INTERNAL MEDICINE

## 2021-01-01 PROCEDURE — 97165 OT EVAL LOW COMPLEX 30 MIN: CPT

## 2021-01-01 PROCEDURE — 74011000250 HC RX REV CODE- 250: Performed by: INTERNAL MEDICINE

## 2021-01-01 PROCEDURE — 85027 COMPLETE CBC AUTOMATED: CPT

## 2021-01-01 RX ORDER — CLOPIDOGREL BISULFATE 75 MG/1
75 TABLET ORAL DAILY
Qty: 30 TAB | Refills: 2 | Status: SHIPPED | OUTPATIENT
Start: 2021-01-02 | End: 2022-08-08

## 2021-01-01 RX ORDER — ASPIRIN 81 MG/1
81 TABLET ORAL DAILY
Qty: 30 TAB | Refills: 0 | Status: SHIPPED | OUTPATIENT
Start: 2021-01-02

## 2021-01-01 RX ORDER — SODIUM CHLORIDE 9 MG/ML
10 INJECTION INTRAMUSCULAR; INTRAVENOUS; SUBCUTANEOUS
Status: COMPLETED | OUTPATIENT
Start: 2021-01-01 | End: 2021-01-01

## 2021-01-01 RX ORDER — FAMOTIDINE 20 MG/1
20 TABLET, FILM COATED ORAL DAILY
Qty: 30 TAB | Refills: 0 | Status: SHIPPED | OUTPATIENT
Start: 2021-01-02

## 2021-01-01 RX ORDER — ATORVASTATIN CALCIUM 80 MG/1
80 TABLET, FILM COATED ORAL
Qty: 30 TAB | Refills: 0 | Status: SHIPPED | OUTPATIENT
Start: 2021-01-01

## 2021-01-01 RX ORDER — SULFAMETHOXAZOLE AND TRIMETHOPRIM 800; 160 MG/1; MG/1
1 TABLET ORAL 2 TIMES DAILY
Qty: 14 TAB | Refills: 0 | Status: SHIPPED | OUTPATIENT
Start: 2021-01-01 | End: 2021-09-07

## 2021-01-01 RX ADMIN — TAMSULOSIN HYDROCHLORIDE 0.4 MG: 0.4 CAPSULE ORAL at 10:02

## 2021-01-01 RX ADMIN — ASPIRIN 81 MG: 81 TABLET ORAL at 10:02

## 2021-01-01 RX ADMIN — SODIUM CHLORIDE 10 ML: 9 INJECTION INTRAMUSCULAR; INTRAVENOUS; SUBCUTANEOUS at 13:30

## 2021-01-01 RX ADMIN — Medication 10 ML: at 10:10

## 2021-01-01 RX ADMIN — FAMOTIDINE 20 MG: 20 TABLET, FILM COATED ORAL at 10:02

## 2021-01-01 RX ADMIN — CLOPIDOGREL BISULFATE 75 MG: 75 TABLET ORAL at 10:04

## 2021-01-01 RX ADMIN — HEPARIN SODIUM 5000 UNITS: 5000 INJECTION INTRAVENOUS; SUBCUTANEOUS at 10:08

## 2021-01-01 RX ADMIN — Medication 2 TABLET: at 10:03

## 2021-01-01 NOTE — PROGRESS NOTES
Problem: Mobility Impaired (Adult and Pediatric)  Goal: *Acute Goals and Plan of Care (Insert Text)  Description: Pt able to amb 150 feet in hallways with his rollator without any unsteadiness or difficulty. No significant weakness or deficits noted and pt is not in need of acute PT. Recommend d/c back to independent living facility . PLOF: Pt lives at a [facility] living facility  and uses a rollator to ambulation long distances. No history of falls reported. Outcome: Resolved/Met     PHYSICAL THERAPY EVALUATION AND DISCHARGE    Patient: Jannifer Spurling (50 y.o. male)  Date: 1/1/2021  Primary Diagnosis: TIA (transient ischemic attack) [G45.9]        Precautions:  Fall    ASSESSMENT :  Based on the objective data described below, the patient presents with decreased endurance. RN cleared for PT to work with pt. Pt found standing up from his chair without an AD, in NAD, willing to work with PT. He reports no pain or weakness and states he has been back to baseline ever since the day after the TIA. Pt willing to amb in hallways with his rollator, able to amb 150 feet with supervision and no unsteadiness noted. Finger-nose coordination WFL and no strength. sensation deficits noted. Pt left sitting on EOB with call bell and all needs met. RN made aware. Pt not in need of acute PT. Recommend d/c home back to [facility] living. He has  a rollator. Patient does not require further skilled intervention at this level of care. PLAN :  Recommendations and Planned Interventions:   No formal PT needs identified at this time. Discharge Recommendations: [facility] living  Further Equipment Recommendations for Discharge: N/A     SUBJECTIVE:   Patient stated I hopefully can leave today, I feel fine! Nisha Leblanc    OBJECTIVE DATA SUMMARY:     Past Medical History:   Diagnosis Date    BPH (benign prostatic hyperplasia)     History of shingles 2007    rt arm/neck    Hyperlipidemia     TIA (transient ischemic attack) 12/31/2020    Vasovagal episode      Past Surgical History:   Procedure Laterality Date    HX CATARACT REMOVAL Bilateral 12/2016    dec/2016-left eye, jan/2017-right eye    HX CYST REMOVAL  1947    HX MOHS PROCEDURES  1980s    left ear    HX TURP  07/2017    dizzy episodes post op 2-3 wks     Barriers to Learning/Limitations: None  Compensate with: N/A  Home Situation:   Home Situation  Home Environment: Independent living  # Steps to Enter: 0  One/Two Story Residence: One story  Living Alone: Yes  Support Systems: Other (comments)(independent living situation )  Patient Expects to be Discharged to:: Independent living facility  Current DME Used/Available at Home: Walker, rollator  Tub or Shower Type: Shower  Critical Behavior:  Neurologic State: Alert  Orientation Level: Oriented X4  Cognition: Follows commands  Safety/Judgement: Fall prevention;Awareness of environment  Psychosocial  Patient Behaviors: Calm;Cooperative  Purposeful Interaction: Yes  Pt Identified Daily Priority: Clinical issues (comment)  Caritas Process: Establish trust  Caring Interventions: Reassure  Reassure: Informing  Skin Condition/Temp: Warm        Skin Integumentary  Skin Color: Appropriate for ethnicity  Skin Condition/Temp: Warm     Strength:    Strength: Generally decreased, functional    Tone & Sensation:   Tone: Normal    Sensation: Intact    Range Of Motion:  AROM: Within functional limits    Functional Mobility:  Bed Mobility:     Supine to Sit: Modified independent  Sit to Supine: Modified independent  Scooting: Modified independent  Transfers:  Sit to Stand: Modified independent  Stand to Sit: Modified independent    Balance:   Sitting: Intact  Standing: Intact;With support    Ambulation/Gait Training:  Distance (ft): 150 Feet (ft)  Assistive Device: Walker, rollator  Ambulation - Level of Assistance: Supervision        Gait Abnormalities: Decreased step clearance        Base of Support: Narrowed     Speed/Mary: Accelerated    Pain:  Pain level  pre-treatment: 0/10   Pain level post-treatment: 0/10  Pain Intervention(s): Medication (see MAR); Rest, Repositioning   Response to intervention: Nurse notified, See doc flow    Activity Tolerance:   Pt tolerated mobility well  Please refer to the flowsheet for vital signs taken during this treatment. After treatment:   []         Patient left in no apparent distress sitting up in chair  [x]         Patient left in no apparent distress in bed  [x]         Call bell left within reach  [x]         Nursing notified  []         Caregiver present  []         Bed alarm activated  []         SCDs applied    COMMUNICATION/EDUCATION:   [x]         Role of Physical Therapy in the acute care setting. [x]         Fall prevention education was provided and the patient/caregiver indicated understanding. [x]         Patient/family have participated as able in goal setting and plan of care. []         Patient/family agree to work toward stated goals and plan of care. []         Patient understands intent and goals of therapy, but is neutral about his/her participation. []         Patient is unable to participate in goal setting/plan of care: ongoing with therapy staff.  []         Other:     Thank you for this referral.  Sonam Zambrano   Time Calculation: 8 mins      Eval Complexity: History: LOW Complexity : Zero comorbidities / personal factors that will impact the outcome / POCExam:LOW Complexity : 1-2 Standardized tests and measures addressing body structure, function, activity limitation and / or participation in recreation  Presentation: LOW Complexity : Stable, uncomplicated  Clinical Decision Making:Low Complexity    Overall Complexity:LOW

## 2021-01-01 NOTE — PROGRESS NOTES
SLP NOTE -    1120: ST orders received and chart review completed. Pt currently working with OT. Per d/w RN and CNA, pt tolerating current diet of regular texture with thin liquid. SLP will FU as available.     Thank you for this referral.    Claudia Hernandez M.S., [id number] [facility]  Speech-Language Pathologist

## 2021-01-01 NOTE — DISCHARGE SUMMARY
Discharge Summary     Patient ID:  Kayla Templeton  [id number]  66 y.o.  1/20/1926  Body mass index is 23.57 kg/m². PCP on record: Mar Barney MD    Admit date: 12/31/2020  Discharge date and time: 1/1/2021    Discharge Diagnoses:                                           1 TIA   2 HTN   3 HLD   4 UTI - POA   5 BPH       Consults: Neurology          Hospital Course by problems:    Patient with past medical history of hypertension BPH admitted with sudden onset of speech disturbance visual disturbance suspecting initial stroke stroke test was called in. While in hospital with an initial view was patient recovered completely. Initial CAT scan of the head does not show any acute stroke MRI head pending results    Patient will be treated for TIA, discussed with neurology, patient does have some carotid disease but not significant    Patient will be discharged with Plavix and aspirin to be continued DAPT for 3 months and then aspirin per day    Continue high potency statin    Continue Bactrim for his UTI-urine culture is pending      Patient seen and examined by me on discharge day.   Pertinent Findings:  Stable    Significant Diagnostic Studies:    MRI brain pending    CTA head and neck pending-bleeding reported verbally to me by neurology    Results  CT HEAD WO CONT (Accession [id number]) (Order [id number])  Allergies     High: Lyrica [Pregabalin]   Medium: Clindamycin   Exam Information    Status Exam Begun  Exam Ended    Final [99] 12/31/2020 12:39 12/31/2020 12:50 PM [id number] 12:50 PM   Result Information    Status: Final result (Exam End: 12/31/2020 12:50) Provider Status: Open   Study Result    CT HEAD WO CONT     History: Blurry vision left eye, difficulty word finding        Comparison: None.     TECHNIQUE: 5 mm helical scan obtained of the head were obtained from the skull  vertex through the base of the skull without intravenous contrast.       All CT scans at this facility are performed using dose optimization technique as  appropriate to a performed exam, to include automated exposure control,  adjustment of the mA and/or kV according to patient size (including appropriate  matching first site-specific examinations), or use of iterative reconstruction  technique.     BRAIN RESULT:       Acute change:   No evidence of an acute infarct or other acute parenchymal  process. Hemorrhage:    No evidence of acute intracranial hemorrhage.     Mass Effect / Mass Lesion:    There is no evidence of an intracranial mass or  extraaxial fluid collection. No significant mass effect. Chronic change:    None apparent.     Parenchyma: There is mild generalized volume loss. The brain parenchyma is  otherwise within normal limits for age.     Ventricles:     Ventricular enlargement concordant with the degree of  parenchymal volume loss.     Other: Status post cataract surgery. Mild sinonasal mucosal thickening. Mastoid  air cells are patent. The skull and visualized extracranial soft tissues are  grossly normal.          IMPRESSION  IMPRESSION:       No acute findings to explain patient's symptoms, though MR is more sensitive for  acute stroke evaluation. Mild generalized volume loss.     Results discussed with Dr. Merissa Rosenbaum on 12/31/2020 at 1255 hours per stroke alert  protocol. Imaging        Pertinent Lab Data:  Recent Labs     01/01/21  0139 12/31/20  1245   WBC 6.0 5.9   HGB 12.4* 13.4   HCT 37.0 41.0    163     Recent Labs     01/01/21  0139 12/31/20  1245    135*   K 3.9 4.4    103   CO2 27 29   GLU 80 112*   BUN 16 20*   CREA 0.71 0.91   CA 9.2 8.6   ALB 3.1*  --    ALT 17  --    INR  --  1.0       DISCHARGE MEDICATIONS:   @  Current Discharge Medication List      START taking these medications    Details   aspirin delayed-release 81 mg tablet Take 1 Tab by mouth daily. Qty: 30 Tab, Refills: 0      atorvastatin (LIPITOR) 80 mg tablet Take 1 Tab by mouth nightly.   Qty: 30 Tab, Refills: 0      clopidogreL (PLAVIX) 75 mg tab Take 1 Tab by mouth daily. Qty: 30 Tab, Refills: 2    Comments: 12 weeks only no refill after that      famotidine (PEPCID) 20 mg tablet Take 1 Tab by mouth daily. Qty: 30 Tab, Refills: 0         CONTINUE these medications which have CHANGED    Details   trimethoprim-sulfamethoxazole (BACTRIM DS, SEPTRA DS) 160-800 mg per tablet Take 1 Tab by mouth two (2) times a day. Qty: 14 Tab, Refills: 0         CONTINUE these medications which have NOT CHANGED    Details   tamsulosin (FLOMAX) 0.4 mg capsule TAKE 1 CAPSULE DAILY  Qty: 90 Cap, Refills: 3      gabapentin (NEURONTIN) 100 mg capsule       fluticasone (FLONASE) 50 mcg/actuation nasal spray       finasteride (PROSCAR) 5 mg tablet Take 1 Tab by mouth daily. Indications: benign prostatic hyperplasia with lower urinary tract sx  Qty: 90 Tab, Refills: 3    Associated Diagnoses: BPH associated with nocturia      cholecalciferol, vitamin D3, 2,000 unit tab Take  by Mouth. OMEGA-3 FATTY ACIDS-FISH OIL PO 1,000 mg. STOP taking these medications       simvastatin (ZOCOR) 20 mg tablet Comments:   Reason for Stopping:                 My Recommended Diet, Activity, Wound Care, and follow-up labs are listed in the patient's Discharge Insturctions which I have personally completed and reviewed. Disposition:     [x] Home with family     [] [facility] [place] PT/RN   [] SNF/NH   [] Inpatient Rehab/[facility]BI[facility]  Condition at Discharge:  Stable    Follow up with:   PCP : Mar Barney MD      Please follow-up tests/labs that are still pendin. None  2.    >30 minutes spent coordinating this discharge (review instructions/follow-up, prescriptions, preparing report for sign off)    Disclaimer: Sections of this note are dictated utilizing voice recognition software, which may have resulted in some phonetic based errors in grammar and contents.  Even though attempts were made to correct all the mistakes, some may have been missed, and remained in the body of the document. If questions arise, please contact our department.     Signed:  Hernan Saleh MD  1/1/2021  2:46 PM

## 2021-01-01 NOTE — DISCHARGE INSTRUCTIONS
Patient armband removed and shredded  MyChart Activation    Thank you for requesting access to ipadio. Please follow the instructions below to securely access and download your online medical record. ipadio allows you to send messages to your doctor, view your test results, renew your prescriptions, schedule appointments, and more. How Do I Sign Up? 1. In your internet browser, go to [email or web address]  2. Click on the First Time User? Click Here link in the Sign In box. You will be redirect to the New Member Sign Up page. 3. Enter your ipadio Access Code exactly as it appears below. You will not need to use this code after youve completed the sign-up process. If you do not sign up before the expiration date, you must request a new code. ipadio Access Code: [email or web address]  Expires: 2021 12:39 PM (This is the date your ipadio access code will )    4. Enter the last four digits of your Social Security Number (xxxx) and Date of Birth (mm/dd/yyyy) as indicated and click Submit. You will be taken to the next sign-up page. 5. Create a ipadio ID. This will be your ipadio login ID and cannot be changed, so think of one that is secure and easy to remember. 6. Create a ipadio password. You can change your password at any time. 7. Enter your Password Reset Question and Answer. This can be used at a later time if you forget your password. 8. Enter your e-mail address. You will receive e-mail notification when new information is available in [address]. 9. Click Sign Up. You can now view and download portions of your medical record. 10. Click the Download Summary menu link to download a portable copy of your medical information. Additional Information    If you have questions, please visit the Frequently Asked Questions section of the ipadio website at [email or web address] Remember, ipadio is NOT to be used for urgent needs.  For medical emergencies, dial 911.        DISCHARGE SUMMARY from Nurse    PATIENT INSTRUCTIONS:    What to do at Home:  Recommended activity: Activity as tolerated,     If you experience any of the following symptoms chest pain, shortness of breath, fever, chills, elevated temperature greater than 100.9, drooling, numbness or tingling in arms or legs, please follow up with nearest Emergency room. *  Please give a list of your current medications to your Primary Care Provider. *  Please update this list whenever your medications are discontinued, doses are      changed, or new medications (including over-the-counter products) are added. *  Please carry medication information at all times in case of emergency situations. These are general instructions for a healthy lifestyle:    No smoking/ No tobacco products/ Avoid exposure to second hand smoke  Surgeon General's Warning:  Quitting smoking now greatly reduces serious risk to your health. Obesity, smoking, and sedentary lifestyle greatly increases your risk for illness    A healthy diet, regular physical exercise & weight monitoring are important for maintaining a healthy lifestyle    You may be retaining fluid if you have a history of heart failure or if you experience any of the following symptoms:  Weight gain of 3 pounds or more overnight or 5 pounds in a week, increased swelling in our hands or feet or shortness of breath while lying flat in bed. Please call your doctor as soon as you notice any of these symptoms; do not wait until your next office visit. The discharge information has been reviewed with the patient. The patient verbalized understanding. Discharge medications reviewed with the patient and appropriate educational materials and side effects teaching were provided.   ___________________________________________________________________________________________________________________________________

## 2021-01-01 NOTE — PROGRESS NOTES
OT order received and chart reviewed. Patient off the unit for testing. Will continue to follow and see patient when available/as appropriate.         Thank you for this referral,   Ephraim Kuo MS, OTR/L

## 2021-01-01 NOTE — H&P
History & Physical    Patient: Radha Delgado MRN: [id number]  CSN: [id number]    YOB: 1926  Age: 80 y.o. Sex: male      DOA: 12/31/2020    Chief Complaint:   Chief Complaint   Patient presents with    Blurred Vision          HPI:     Radha Delgado is a 80 y.o.  male with history of BPH, hyperlipidemia who presented to the ED today for evaluation after an episode of dysarthria and acute change in vision last night. Patient states he was looking down at a book and the right side was completely blurry but he was able to see out of the left side. Shortly thereafter he was not able to find his words and also had trouble remembering his prayers. He sat on the edge of the bed and symptoms resolved after approximately 5 to 10 minutes. When he woke up this morning he felt back to his baseline, he was able to take a shower and perform his ADLs without difficulty. No headache, no chest pain, no weakness, no shortness of breath. ED course  Code as activated in the ED. Telemetry neurology consulted. Vital signs stable, oxygen sats 100% on room air. CBC stable, sodium 135, potassium 4.4, BUN 20, creatinine 0.91, troponin <0.02. EKG normal sinus rhythm. Chest x-ray hypoinflated lungs otherwise no acute cardiopulmonary process. CT head no acute findings to explain symptoms, mild generalized volume loss. CTA head neck obtained, official read pending. He was loaded with aspirin 325 mg and Plavix 300 mg p.o. Patient transferred from [facility] ED to [facility] neurology unit.     Past Medical History:   Diagnosis Date    BPH (benign prostatic hyperplasia)     History of shingles 2007    rt arm/neck    Hyperlipidemia     TIA (transient ischemic attack) 12/31/2020    Vasovagal episode        Past Surgical History:   Procedure Laterality Date    HX CATARACT REMOVAL Bilateral 12/2016    dec/2016-left eye, jan/2017-right eye    HX CYST REMOVAL  1947    HX MOHS PROCEDURES  1980s    left ear  HX TURP  2017    dizzy episodes post op 2-3 wks       Family History   Problem Relation Age of Onset    Diabetes Father     Diabetes Brother     Heart Attack Neg Hx     Stroke Neg Hx        Social History     Socioeconomic History    Marital status: UNKNOWN     Spouse name: Not on file    Number of children: Not on file    Years of education: Not on file    Highest education level: Not on file   Tobacco Use    Smoking status: Former Smoker     Quit date: 1960     Years since quittin.0    Smokeless tobacco: Never Used   Substance and Sexual Activity    Alcohol use: No    Drug use: No       Prior to Admission medications    Medication Sig Start Date End Date Taking? Authorizing Provider   tamsulosin (FLOMAX) 0.4 mg capsule TAKE 1 CAPSULE DAILY 20   Margarita Mckinney MD   trimethoprim-sulfamethoxazole (BACTRIM DS, SEPTRA DS) 160-800 mg per tablet Take 1 Tab by mouth two (2) times a day. 20   Matt Leos MD   gabapentin (NEURONTIN) 100 mg capsule  19   Provider, Historical   fluticasone (FLONASE) 50 mcg/actuation nasal spray  8/3/18   Provider, Historical   finasteride (PROSCAR) 5 mg tablet Take 1 Tab by mouth daily. Indications: benign prostatic hyperplasia with lower urinary tract sx 3/12/18   Jeana Chisholm MD   simvastatin (ZOCOR) 20 mg tablet Take  by mouth nightly. Provider, Historical   cholecalciferol, vitamin D3, 2,000 unit tab Take  by Mouth. Provider, Historical   OMEGA-3 FATTY ACIDS-FISH OIL PO 1,000 mg. Provider, Historical       Allergies   Allergen Reactions    Lyrica [Pregabalin] Swelling    Clindamycin Other (comments)     C. Diff         Review of Systems- change in vision and dysarthria last night has since resolved  GENERAL: No fever, chills, malaise, weight changes  HEENT: No change in vision, no earache, tinnitus, sore throat or sinus congestion. NECK: No pain or stiffness. PULMONARY: No shortness of breath, cough or wheeze. Cardiovascular: no pnd or orthopnea, no CP  GASTROINTESTINAL: No abdominal pain, nausea, vomiting or diarrhea, melena or bright red blood per rectum. GENITOURINARY: No urinary frequency, urgency, hesitancy or dysuria. MUSCULOSKELETAL: No joint or muscle pain, no back pain, no recent trauma. DERMATOLOGIC: No rash, no itching, no lesions. ENDOCRINE: No polyuria, polydipsia, no heat or cold intolerance. No recent change in weight. HEMATOLOGICAL: No anemia or easy bruising or bleeding. NEUROLOGIC: No headache, seizures, numbness, tingling or weakness. Physical Exam:     Physical Exam:  Visit Vitals  BP (!) 161/60   Pulse 64   Temp 98.1 °F (36.7 °C)   Resp 16   Ht 5' 8\" (1.727 m)   Wt 70.3 kg (155 lb)   SpO2 100%   BMI 23.57 kg/m²      O2 Device: Room air    Temp (24hrs), Av.9 °F (36.6 °C), Min:97.7 °F (36.5 °C), Max:98.1 °F (36.7 °C)    No intake/output data recorded. No intake/output data recorded. General:  Alert, cooperative, no distress, appears stated age. Head: Normocephalic, without obvious abnormality, atraumatic. Eyes:  Conjunctivae/corneas clear. PERRL, EOMs intact. Nose: Nares normal. No drainage or sinus tenderness. Neck: Supple, symmetrical, trachea midline, no adenopathy, thyroid: no enlargement, no carotid bruit and no JVD. Lungs:   Clear to auscultation bilaterally. Heart:  Regular rate and rhythm, S1, S2 normal.     Abdomen: Soft, non-tender. Bowel sounds normal.    Extremities: Extremities normal, atraumatic, no cyanosis or edema. Pulses: 2+ and symmetric all extremities. Skin:  No rashes or lesions   Neurologic: AAOx3, No focal motor or sensory deficit.  equal.  Muscle strength 5/5 BUE and BLE. Labs Reviewed: All lab results for the last 24 hours reviewed.   Recent Results (from the past 24 hour(s))   GLUCOSE, POC    Collection Time: 20 12:41 PM   Result Value Ref Range    Glucose (POC) 111 (H) 70 - 110 mg/dL   CBC WITH AUTOMATED DIFF    Collection Time: 12/31/20 12:45 PM   Result Value Ref Range    WBC 5.9 4.6 - 13.2 K/uL    RBC 4.59 (L) 4.70 - 5.50 M/uL    HGB 13.4 13.0 - 16.0 g/dL    HCT 41.0 36.0 - 48.0 %    MCV 89.3 74.0 - 97.0 FL    MCH 29.2 24.0 - 34.0 PG    MCHC 32.7 31.0 - 37.0 g/dL    RDW 13.5 11.6 - 14.5 %    PLATELET 021 777 - 377 K/uL    MPV 10.6 9.2 - 11.8 FL    NEUTROPHILS 74 (H) 40 - 73 %    LYMPHOCYTES 20 (L) 21 - 52 %    MONOCYTES 5 3 - 10 %    EOSINOPHILS 1 0 - 5 %    BASOPHILS 0 0 - 2 %    ABS. NEUTROPHILS 4.4 1.8 - 8.0 K/UL    ABS. LYMPHOCYTES 1.2 0.9 - 3.6 K/UL    ABS. MONOCYTES 0.3 0.05 - 1.2 K/UL    ABS. EOSINOPHILS 0.1 0.0 - 0.4 K/UL    ABS.  BASOPHILS 0.0 0.0 - 0.1 K/UL    DF AUTOMATED     METABOLIC PANEL, BASIC    Collection Time: 12/31/20 12:45 PM   Result Value Ref Range    Sodium 135 (L) 136 - 145 mmol/L    Potassium 4.4 3.5 - 5.5 mmol/L    Chloride 103 100 - 111 mmol/L    CO2 29 21 - 32 mmol/L    Anion gap 3 3.0 - 18 mmol/L    Glucose 112 (H) 74 - 99 mg/dL    BUN 20 (H) 7.0 - 18 MG/DL    Creatinine 0.91 0.6 - 1.3 MG/DL    BUN/Creatinine ratio 22 (H) 12 - 20      GFR est AA >60 >60 ml/min/1.73m2    GFR est non-AA >60 >60 ml/min/1.73m2    Calcium 8.6 8.5 - 10.1 MG/DL   TROPONIN I    Collection Time: 12/31/20 12:45 PM   Result Value Ref Range    Troponin-I, QT <0.02 0.0 - 0.045 NG/ML   PROTHROMBIN TIME + INR    Collection Time: 12/31/20 12:45 PM   Result Value Ref Range    Prothrombin time 13.2 11.5 - 15.2 sec    INR 1.0 0.8 - 1.2     EKG, 12 LEAD, INITIAL    Collection Time: 12/31/20 12:53 PM   Result Value Ref Range    Ventricular Rate 63 BPM    Atrial Rate 63 BPM    P-R Interval 186 ms    QRS Duration 76 ms    Q-T Interval 384 ms    QTC Calculation (Bezet) 392 ms    Calculated P Axis 57 degrees    Calculated R Axis 46 degrees    Calculated T Axis 79 degrees    Diagnosis       Normal sinus rhythm  Normal ECG  When compared with ECG of 12-MAY-2017 11:41,  No significant change was found  Confirmed by Selena Leon M.D., [address] [place] [address] ([id number]) on 12/31/2020 2:18:38 PM     URINALYSIS W/ RFLX MICROSCOPIC    Collection Time: 12/31/20  4:33 PM   Result Value Ref Range    Color YELLOW      Appearance TURBID      Specific gravity 1.016 1.005 - 1.030      pH (UA) 7.0 5.0 - 8.0      Protein TRACE (A) NEG mg/dL    Glucose Negative NEG mg/dL    Ketone Negative NEG mg/dL    Bilirubin Negative NEG      Blood MODERATE (A) NEG      Urobilinogen 0.2 0.2 - 1.0 EU/dL    Nitrites Negative NEG      Leukocyte Esterase LARGE (A) NEG     URINE MICROSCOPIC ONLY    Collection Time: 12/31/20  4:33 PM   Result Value Ref Range    WBC TOO NUMEROUS TO COUNT 0 - 4 /hpf    RBC 4 to 10 0 - 5 /hpf    Epithelial cells Negative 0 - 5 /lpf    Bacteria 2+ (A) NEG /hpf     CT Results (most recent):  Results from Hospital Encounter encounter on 12/31/20   CT HEAD WO CONT    Narrative CT HEAD WO CONT    History: Blurry vision left eye, difficulty word finding       Comparison: None. TECHNIQUE: 5 mm helical scan obtained of the head were obtained from the skull  vertex through the base of the skull without intravenous contrast.      All CT scans at this facility are performed using dose optimization technique as  appropriate to a performed exam, to include automated exposure control,  adjustment of the mA and/or kV according to patient size (including appropriate  matching first site-specific examinations), or use of iterative reconstruction  technique. BRAIN RESULT:      Acute change:   No evidence of an acute infarct or other acute parenchymal  process. Hemorrhage:    No evidence of acute intracranial hemorrhage. Mass Effect / Mass Lesion:    There is no evidence of an intracranial mass or  extraaxial fluid collection. No significant mass effect. Chronic change:    None apparent. Parenchyma: There is mild generalized volume loss. The brain parenchyma is  otherwise within normal limits for age.     Ventricles:     Ventricular enlargement concordant with the degree of  parenchymal volume loss. Other: Status post cataract surgery. Mild sinonasal mucosal thickening. Mastoid  air cells are patent. The skull and visualized extracranial soft tissues are  grossly normal.          Impression IMPRESSION:      No acute findings to explain patient's symptoms, though MR is more sensitive for  acute stroke evaluation. Mild generalized volume loss. Results discussed with Dr. Boyd Gandhi on 12/31/2020 at 1255 hours per stroke alert  protocol. XR Results (most recent):  Results from Hospital Encounter encounter on 12/31/20   XR CHEST PORT    Narrative EXAM: XR CHEST PORT    INDICATION: 80 years Male. code s. ADDITIONAL HISTORY: Possible stroke    TECHNIQUE: Frontal view of the chest.    COMPARISON: No comparison study is available at the time of this dictation. FINDINGS:    Hypoinflated lungs. Crowding of lung markings at the bases. The cardiac silhouette is within normal limits in size. Mild tortuosity the  thoracic aorta. Atherosclerotic calcifications are noted in the aorta. No confluent consolidation is appreciated. There is no evidence of pleural effusion. There is no evidence of pneumothorax. There is no evidence of acute osseous abnormality. Impression IMPRESSION:  1. Hypoinflated lungs. 2.  No radiographic evidence of acute cardiopulmonary process. Procedures/imaging: see electronic medical records for all procedures/Xrays and details which were not copied into this note but were reviewed prior to creation of Plan      Assessment/Plan     1. TIA r/o CVA  - dysarthria and vision change resolved prior to ED presentation  2. UTI  3. Mild hyponatremia  4. Hyperlipidemia  5. BPH  6.  Hx sick sinus syndrome (several years ago, resolved, no interventions per pt)      Neurology consulted by accepting physician earlier today, appreciate assistance  Follow CTa head/neck results  MRI brain  Echo  Neuro checks  ASA, Plavix, statin  PT, OT, SLP eval and treat  Monitor CBC, lfts, BMP, A1C, lipid panel, TSH in am  Add on urine culture  IV Ceftriaxone    Further course of treatment pending work up and clinical progress    Diet: Regular, 2 gram sodium restriction  DVT/GI Prophylaxis: Hep SQ and H2B/PPI  DNR- pt stated no heroic measures    Contact: Daughter Miguel Angel Amor [phone]    Discussed with patient at bedside about hospital admission and plan care. He understands and agrees. I was able to speak with his daughter Rosita Spaulding to update on hospitalization and plan of care. All questions answered to the best of my ability. Disclaimer: Sections of this note are dictated using utilizing voice recognition software. Minor typographical errors may be present. If questions arise, please do not hesitate to contact me or call our department.         Jerilyn King NP-C  [facility]  pager [phone]

## 2021-01-01 NOTE — ROUTINE PROCESS
TRANSFER - OUT REPORT:    Verbal report given to Minh Howell RN (name) on Eugenia Glover  being transferred to [facility] room 410 (unit) for routine progression of care       Report consisted of patients Situation, Background, Assessment and   Recommendations(SBAR). Information from the following report(s) SBAR was reviewed with the receiving nurse. Lines:   Peripheral IV 12/31/20 Right Antecubital (Active)   Site Assessment Clean, dry, & intact 12/31/20 1249   Phlebitis Assessment 0 12/31/20 1249   Infiltration Assessment 0 12/31/20 1249   Dressing Status Clean, dry, & intact 12/31/20 1249   Dressing Type Tape;Transparent 12/31/20 1249   Hub Color/Line Status Pink;Flushed 12/31/20 1249   Action Taken Blood drawn 12/31/20 1249        Opportunity for questions and clarification was provided.       Patient transported with:   Monitor

## 2021-01-01 NOTE — PROGRESS NOTES
Problem: Self Care Deficits Care Plan (Adult)  Goal: *Acute Goals and Plan of Care (Insert Text)  Outcome: Resolved/Met     OCCUPATIONAL THERAPY EVALUATION/DISCHARGE    Patient: Ivy Joseph (56 y.o. male)  Date: 1/1/2021  Primary Diagnosis: TIA (transient ischemic attack) [G45.9]  Precautions  Aspiration, Fall  PLOF:Patient was independent with self-care and used a rollator for functional mobility PTA. ASSESSMENT AND RECOMMENDATIONS:  Based on the objective data described below, the patient presents with no deficits that impede pt function with ADLs, functional transfers, and functional mobility. OT performed vision cancellation test with patient and patient able to complete correctly with 100% accuracy reporting blurriness has resolved. At this time patient is safe to d/c to independent with continued family/ support. OT to d/c from caseload at this time. Skilled occupational therapy is not indicated at this time.   Discharge Recommendations: None  Further Equipment Recommendations for Discharge: N/A      SUBJECTIVE:   Patient stated I feel much better now, I knew that night I was better    OBJECTIVE DATA SUMMARY:     Past Medical History:   Diagnosis Date    BPH (benign prostatic hyperplasia)     History of shingles 2007    rt arm/neck    Hyperlipidemia     TIA (transient ischemic attack) 12/31/2020    Vasovagal episode      Past Surgical History:   Procedure Laterality Date    HX CATARACT REMOVAL Bilateral 12/2016    dec/2016-left eye, jan/2017-right eye    HX CYST REMOVAL  1947    HX MOHS PROCEDURES  1980s    left ear    HX TURP  07/2017    dizzy episodes post op 2-3 wks     Barriers to Learning/Limitations: None  Compensate with: visual, verbal, tactile, kinesthetic cues/model    Home Situation:   Home Situation  Home Environment: Independent living  # Steps to Enter: 0  One/Two Story Residence: One story  Living Alone: Yes  Support Systems: Other (comments)(independent living situation )  Patient Expects to be Discharged to[de-identified] Independent living facility  Current DME Used/Available at Home: Olive Poser, rollator  Tub or Shower Type: Shower  [x]     Right hand dominant   []     Left hand dominant    Cognitive/Behavioral Status:  Neurologic State: Alert  Orientation Level: Oriented X4  Cognition: Follows commands  Safety/Judgement: Fall prevention; Awareness of environment    Skin: Intact  Edema: None noted    Vision/Perceptual:    Acuity: Within Defined Limits;Able to read normal print without difficulty(reports blurriness resolved)    Corrective Lenses: Reading glasses    Coordination: BUE  Fine Motor Skills-Upper: Left Intact; Right Intact    Gross Motor Skills-Upper: Left Intact; Right Intact    Balance:  Sitting: Intact  Standing: Intact; With support    Strength: BUE  Strength: Generally decreased, functional     Tone & Sensation: BUE  Tone: Normal  Sensation: Intact    Range of Motion: BUE  AROM: Within functional limits      Functional Mobility and Transfers for ADLs:  Bed Mobility:  Supine to Sit: Modified independent  Sit to Supine: Modified independent  Scooting: Modified independent    Transfers:  Sit to Stand: Modified independent  Stand to Sit: Modified independent   Toilet Transfer : Modified independent(simulation with recliner)     ADL Assessment:  Feeding: Independent    Oral Facial Hygiene/Grooming: Independent    Bathing: Modified independent    Upper Body Dressing: Independent    Lower Body Dressing: Modified independent    Toileting: Modified independent    ADL Intervention:  Grooming  Grooming Assistance: Independent  Position Performed: Seated in chair  Brushing/Combing Hair: Independent(pt issued black comb)    Lower Body Dressing Assistance  Dressing Assistance: Modified independent  Socks: Modified independent  Leg Crossed Method Used: Yes  Position Performed: Seated in chair    Cognitive Retraining  Safety/Judgement: Fall prevention; Awareness of environment    Pain:  Pain level pre-treatment: 0/10   Pain level post-treatment: 0/10   Pain Intervention(s): Medication (see MAR); Response to intervention: Nurse notified, See doc flow    Activity Tolerance:   Good    Please refer to the flowsheet for vital signs taken during this treatment. After treatment:   [x]  Patient left in no apparent distress sitting up in chair  []  Patient left in no apparent distress in bed  [x]  Call bell left within reach  [x]  Nursing notified  []  Caregiver present  []  Bed alarm activated    COMMUNICATION/EDUCATION:   [x]      Role of Occupational Therapy in the acute care setting  [x]      Home safety education was provided and the patient/caregiver indicated understanding. [x]      Patient/family have participated as able and agree with findings and recommendations. []      Patient is unable to participate in plan of care at this time. Thank you for this referral.  Елена West OTR/L  Time Calculation: 24 mins      Eval Complexity: History: MEDIUM Complexity : Expanded review of history including physical, cognitive and psychosocial  history ; Examination: LOW Complexity : 1-3 performance deficits relating to physical, cognitive , or psychosocial skils that result in activity limitations and / or participation restrictions ;    Decision Making:LOW Complexity : No comorbidities that affect functional and no verbal or physical assistance needed to complete eval tasks

## 2021-01-01 NOTE — PROGRESS NOTES
Discharge instructions given verbally and written all questioned answered IV, armband, and tele discontinued prescriptions called into patient local pharmacy. Awaiting family/friend to transport home. As part of the discharge instructions, medications already given today were discussed with the patient. The next dose due of all ordered meds was highlighted as part of the medication discharge instructions. Discussed with the patient the importance of taking medications as directed, as well as the side effects and adverse reactions to medications ordered.

## 2021-01-01 NOTE — ROUTINE PROCESS
TRANSFER - IN REPORT:    Verbal report received from [facility]) on [employer]  being received from [facility](unit) for routine progression of care      Report consisted of patients Situation, Background, Assessment and   Recommendations(SBAR). Information from the following report(s) Recent Results was reviewed with the receiving nurse. Opportunity for questions and clarification was provided. Assessment completed upon patients arrival to unit and care assumed. Arrived 2055 room 405  Dr. Nuria to. 2110  NP Ramachandran at bedside -- when she left the room stated \"the MRI can wait until the morning\" screening form completed and faxed will call after 6 am paged mri tech 2 times the last was @[phone]- mri tech called back she is on her way    Primary Nurse Jocelin Perez RN and Roz Harvey RN performed a dual skin assessment on this patient No impairment noted  Keon score is 21      Stroke Education provided to patient and the following topics were discussed    1. Patients personal risk factors for stroke are hypertension    2. Warning signs of Stroke:        * Sudden numbness or weakness of the face, arm or leg, especially on one side of          The body            * Sudden confusion, trouble speaking or understanding        * Sudden trouble seeing in one or both eyes        * Sudden trouble walking, dizziness, loss of balance or coordination        * Sudden severe headache with no known cause      3. Importance of activation Emergency Medical Services ( 9-1-1 ) immediately if experience any warning signs of stroke. 4. Be sure and schedule a follow-up appointment with your primary care doctor or any specialists as instructed. 5. You must take medicine every day to treat your risk factors for stroke. Be sure to take your medicines exactly as your doctor tells you: no more, no less. Know what your medicines are for , what they do. Anti-thrombotics /anticoagulants can help prevent strokes. You are taking the following medicine(s)       6.  Smoking and second-hand smoke greatly increase your risk of stroke, cardiovascular disease and death. Smoking history     7. Information provided was [facility] Stroke Education Binder    8. Documentation of teaching completed in Patient Education Activity and on Care Plan with teaching response noted? yes        Bedside and Verbal shift change report given to Latasha RN (oncoming nurse) by Richie Das RN (offgoing nurse). Report given with SBAR, Kardex, Intake/Output, MAR, Accordion and Recent Results.

## 2021-01-01 NOTE — PROGRESS NOTES
Problem: Falls - Risk of  Goal: *Absence of Falls  Description: Document Cristal Frey Fall Risk and appropriate interventions in the flowsheet.   Outcome: Progressing Towards Goal  Note: Fall Risk Interventions:  Mobility Interventions: OT consult for ADLs, Patient to call before getting OOB, PT Consult for mobility concerns, PT Consult for assist device competence, Utilize walker, cane, or other assistive device         Medication Interventions: Teach patient to arise slowly                   Problem: Pain  Goal: *Control of Pain  Outcome: Progressing Towards Goal     Problem: TIA/CVA Stroke: 0-24 hours  Goal: Psychosocial  Outcome: Progressing Towards Goal     Problem: Patient Education: Go to Patient Education Activity  Goal: Patient/Family Education  Outcome: Progressing Towards Goal

## 2021-01-01 NOTE — CONSULTS
NEUROLOGY CONSULT NOTE    Patient ID:  Eugenia Glover  [id number]  13 y.o.  1/20/1926    Date of Consultation:  January 1, 2021    Referring Physician: Marquis Cristin VEGA    Reason for Consultation:  TIA        Subjective:       History of Present Illness:   Mr Eugenia Glover is a 80 y.o.  male with history of HTN,  hyperlipidemia, BPH, who presented to the ED on 31 Dec 2020  for evaluation after an episode of dysarthria and acute change in vision on 30 Dec 2020. Patient states he was looking down at a book and the right side was completely blurry but he was able to see his right side. Shortly thereafter he was not able to find his words and also had trouble remembering his prayers. He laid down and symptoms resolved after approximately 5 to 10 minutes. When he woke up on 31 Dec  he felt back to his baseline, he was able to take a shower and perform his ADLs without difficulty. No headache, no chest pain, no weakness, no shortness of breath. Today the patient states that he is doing well. He lives in independent living facility. He feels safe to go back home. CTA/brain MRI results are pending. I personally reviewed the imagings by myself. 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 The patient stated that he was not taking  aspirin at home.   He is on simvastatin    Patient Active Problem List    Diagnosis Date Noted    TIA (transient ischemic attack) 12/31/2020    Vasovagal episode 08/11/2017    Chronic diastolic congestive heart failure ([facility] [place] [address] 08/11/2017    SSS (sick sinus syndrome) ([facility] [place] [address] 08/11/2017    Hyperlipidemia 08/11/2017     Past Medical History:   Diagnosis Date    BPH (benign prostatic hyperplasia)     History of shingles 2007    rt arm/neck    Hyperlipidemia     TIA (transient ischemic attack) 12/31/2020    Vasovagal episode       Past Surgical History:   Procedure Laterality Date    HX CATARACT REMOVAL Bilateral 12/2016 dec/2016-left eye, -right eye    HX CYST REMOVAL      HX MOHS PROCEDURES  1980s    left ear    HX TURP  2017    dizzy episodes post op 2-3 wks      Prior to Admission medications    Medication Sig Start Date End Date Taking? Authorizing Provider   tamsulosin (FLOMAX) 0.4 mg capsule TAKE 1 CAPSULE DAILY 20  Yes Margarita Mckinney MD   gabapentin (NEURONTIN) 100 mg capsule  19  Yes Provider, Historical   fluticasone (FLONASE) 50 mcg/actuation nasal spray  8/3/18  Yes Provider, Historical   finasteride (PROSCAR) 5 mg tablet Take 1 Tab by mouth daily. Indications: benign prostatic hyperplasia with lower urinary tract sx 3/12/18  Yes Jeana Chisholm MD   simvastatin (ZOCOR) 20 mg tablet Take  by mouth nightly. Yes Provider, Historical   cholecalciferol, vitamin D3, 2,000 unit tab Take  by Mouth. Yes Provider, Historical   OMEGA-3 FATTY ACIDS-FISH OIL PO 1,000 mg. Yes Provider, Historical   trimethoprim-sulfamethoxazole (BACTRIM DS, SEPTRA DS) 160-800 mg per tablet Take 1 Tab by mouth two (2) times a day. 20   Matt Leos MD     Allergies   Allergen Reactions    Lyrica [Pregabalin] Swelling    Clindamycin Other (comments)     C. Diff      Social History     Tobacco Use    Smoking status: Former Smoker     Quit date: 1960     Years since quittin.0    Smokeless tobacco: Never Used   Substance Use Topics    Alcohol use: No      Family History   Problem Relation Age of Onset    Diabetes Father     Diabetes Brother     Heart Attack Neg Hx     Stroke Neg Hx         Review of Systems    Constitutional: No recent weight change, fever,fatigue, sleep difficulties, or loss of appetite. ENT/Mouth:  No hearing loss, ringing in the ears, chronic sinus problem, nose bleeds sore throat, voice change, hoarseness, swollen glands in neck, or difficulties with chewing and swallowing.    Cardiovascular:  No chest pain/angina pectoris, palpitations,swelling of feet/ankles/hands, or calf pain while walking. Respiratory: No chronic or frequent coughs, spitting up blood, shortness of breath, asthma, or wheezing. Gastrointestinal: No abdominal pain, heartburn, nausea, vomiting, constipation, frequent diarrhea, rectal bleeding, or blood in stool. Genitourinary: No frequent urination, burning or painful urination, blood in urine, incontinence or dribbling. Musculoskeletal:   No joint pain, stiffness/swelling, weakness of muscles, muscle pain/cramp, or back pain. Integument:   No rash/itching, change in skin color, change in hair/nails, or change in color/size of moles. Neurological:   Per HPI otherwise negative   Psychiatric:   No nervousness, depression, hallucinations, paranoia or suspiciousness. Endocrine: No excessive thirst or urination, heat or cold intolerance. Hematologic/Lymphatic: No bleeding/bruising tendency, phlebitis, or past transfusion. Objective:     Patient Vitals for the past 8 hrs:   BP Temp Pulse Resp SpO2   01/01/21 0800 (!) 156/72 97.6 °F (36.4 °C) (!) 59 18 98 %   01/01/21 0400 (!) 124/52 97.6 °F (36.4 °C) 62 18 96 %   01/01/21 0200 134/60 97.4 °F (36.3 °C) (!) 59 18 98 %       General Exam  No acute distress, normal body habitus    HEENT: Normocephalic, atraumatic, Sclera anicteric, normal conjunctiva  Mucous membranes: normal color and hydration     CV: No carotid bruits,   Heart: regular to rate and rhythm. No murmurs     RESP:  CTAB     Neurologic Exam:    MENTAL STATUS:     The patient is awake, alert, and oriented x 4. Fund of knowledge is adequate. Speech is fluent and memory appears to be intact, both long and short term. CRANIAL NERVES:   II -Pupils are midline, symmetric, both reactive to light and accommodation. Visual fields are normal.  Funduscopic examination reveals flat disks bilaterally. III, IV, VI - Extraocular movements are intact and there is no nystagmus.    V - Facial sensation is intact to pinprick and light touch. VII - Face is symmetrical.   VIII - Hearing is present. IX, X, XII- Palate rises symmetrically. Gag is present. Tongue is in the midline. XI - Shoulder shrugging and head turning intact    MOTOR:          Tone is normal.  Muscle bulk is normal.  The patient is 5/5 in all four limbs without any drift. No involuntary movements    SENSATION:  Sensory examination is intact to pinprick, light touch and position sense testing. REFLEXES: 2+ and symmetrical. Plantars are down going. COORDINATION: Cerebellar examination reveals no gross ataxia or dysmetria. GAIT: Normal native gait. Data Review:    Recent Results (from the past 24 hour(s))   GLUCOSE, POC    Collection Time: 12/31/20 12:41 PM   Result Value Ref Range    Glucose (POC) 111 (H) 70 - 110 mg/dL   CBC WITH AUTOMATED DIFF    Collection Time: 12/31/20 12:45 PM   Result Value Ref Range    WBC 5.9 4.6 - 13.2 K/uL    RBC 4.59 (L) 4.70 - 5.50 M/uL    HGB 13.4 13.0 - 16.0 g/dL    HCT 41.0 36.0 - 48.0 %    MCV 89.3 74.0 - 97.0 FL    MCH 29.2 24.0 - 34.0 PG    MCHC 32.7 31.0 - 37.0 g/dL    RDW 13.5 11.6 - 14.5 %    PLATELET 484 086 - 079 K/uL    MPV 10.6 9.2 - 11.8 FL    NEUTROPHILS 74 (H) 40 - 73 %    LYMPHOCYTES 20 (L) 21 - 52 %    MONOCYTES 5 3 - 10 %    EOSINOPHILS 1 0 - 5 %    BASOPHILS 0 0 - 2 %    ABS. NEUTROPHILS 4.4 1.8 - 8.0 K/UL    ABS. LYMPHOCYTES 1.2 0.9 - 3.6 K/UL    ABS. MONOCYTES 0.3 0.05 - 1.2 K/UL    ABS. EOSINOPHILS 0.1 0.0 - 0.4 K/UL    ABS.  BASOPHILS 0.0 0.0 - 0.1 K/UL    DF AUTOMATED     METABOLIC PANEL, BASIC    Collection Time: 12/31/20 12:45 PM   Result Value Ref Range    Sodium 135 (L) 136 - 145 mmol/L    Potassium 4.4 3.5 - 5.5 mmol/L    Chloride 103 100 - 111 mmol/L    CO2 29 21 - 32 mmol/L    Anion gap 3 3.0 - 18 mmol/L    Glucose 112 (H) 74 - 99 mg/dL    BUN 20 (H) 7.0 - 18 MG/DL    Creatinine 0.91 0.6 - 1.3 MG/DL    BUN/Creatinine ratio 22 (H) 12 - 20      GFR est AA >60 >60 ml/min/1.73m2    GFR est non-AA >60 >60 ml/min/1.73m2    Calcium 8.6 8.5 - 10.1 MG/DL   TROPONIN I    Collection Time: 12/31/20 12:45 PM   Result Value Ref Range    Troponin-I, QT <0.02 0.0 - 0.045 NG/ML   PROTHROMBIN TIME + INR    Collection Time: 12/31/20 12:45 PM   Result Value Ref Range    Prothrombin time 13.2 11.5 - 15.2 sec    INR 1.0 0.8 - 1.2     HEMOGLOBIN A1C WITH EAG    Collection Time: 12/31/20 12:45 PM   Result Value Ref Range    Hemoglobin A1c 5.5 4.2 - 5.6 %    Est. average glucose 111 mg/dL   EKG, 12 LEAD, INITIAL    Collection Time: 12/31/20 12:53 PM   Result Value Ref Range    Ventricular Rate 63 BPM    Atrial Rate 63 BPM    P-R Interval 186 ms    QRS Duration 76 ms    Q-T Interval 384 ms    QTC Calculation (Bezet) 392 ms    Calculated P Axis 57 degrees    Calculated R Axis 46 degrees    Calculated T Axis 79 degrees    Diagnosis       Normal sinus rhythm  Normal ECG  When compared with ECG of 12-MAY-2017 11:41,  No significant change was found  Confirmed by Tammy La M.D., [address] [place] [address] [id number]) on 12/31/2020 2:18:38 PM     URINALYSIS W/ RFLX MICROSCOPIC    Collection Time: 12/31/20  4:33 PM   Result Value Ref Range    Color YELLOW      Appearance TURBID      Specific gravity 1.016 1.005 - 1.030      pH (UA) 7.0 5.0 - 8.0      Protein TRACE (A) NEG mg/dL    Glucose Negative NEG mg/dL    Ketone Negative NEG mg/dL    Bilirubin Negative NEG      Blood MODERATE (A) NEG      Urobilinogen 0.2 0.2 - 1.0 EU/dL    Nitrites Negative NEG      Leukocyte Esterase LARGE (A) NEG     URINE MICROSCOPIC ONLY    Collection Time: 12/31/20  4:33 PM   Result Value Ref Range    WBC TOO NUMEROUS TO COUNT 0 - 4 /hpf    RBC 4 to 10 0 - 5 /hpf    Epithelial cells Negative 0 - 5 /lpf    Bacteria 2+ (A) NEG /hpf   GLUCOSE, POC    Collection Time: 12/31/20  9:03 PM   Result Value Ref Range    Glucose (POC) 99 70 - 110 mg/dL   LIPID PANEL    Collection Time: 01/01/21  1:39 AM   Result Value Ref Range    LIPID PROFILE          Cholesterol, total 158 <200 MG/DL    Triglyceride 71 <150 MG/DL    HDL Cholesterol 56 40 - 60 MG/DL    LDL, calculated 87.8 0 - 100 MG/DL    VLDL, calculated 14.2 MG/DL    CHOL/HDL Ratio 2.8 0 - 5.0     TSH 3RD GENERATION    Collection Time: 01/01/21  1:39 AM   Result Value Ref Range    TSH 1.61 0.36 - 3.74 uIU/mL   SED RATE (ESR)    Collection Time: 01/01/21  1:39 AM   Result Value Ref Range    Sed rate, automated 8 0 - 20 mm/hr   HEPATIC FUNCTION PANEL    Collection Time: 01/01/21  1:39 AM   Result Value Ref Range    Protein, total 6.4 6.4 - 8.2 g/dL    Albumin 3.1 (L) 3.4 - 5.0 g/dL    Globulin 3.3 2.0 - 4.0 g/dL    A-G Ratio 0.9 0.8 - 1.7      Bilirubin, total 0.4 0.2 - 1.0 MG/DL    Bilirubin, direct 0.1 0.0 - 0.2 MG/DL    Alk.  phosphatase 49 45 - 117 U/L    AST (SGOT) 9 (L) 10 - 38 U/L    ALT (SGPT) 17 16 - 61 U/L   CBC W/O DIFF    Collection Time: 01/01/21  1:39 AM   Result Value Ref Range    WBC 6.0 4.6 - 13.2 K/uL    RBC 4.27 (L) 4.70 - 5.50 M/uL    HGB 12.4 (L) 13.0 - 16.0 g/dL    HCT 37.0 36.0 - 48.0 %    MCV 86.7 74.0 - 97.0 FL    MCH 29.0 24.0 - 34.0 PG    MCHC 33.5 31.0 - 37.0 g/dL    RDW 13.5 11.6 - 14.5 %    PLATELET 433 467 - 094 K/uL    MPV 10.7 9.2 - 35.3 FL   METABOLIC PANEL, BASIC    Collection Time: 01/01/21  1:39 AM   Result Value Ref Range    Sodium 141 136 - 145 mmol/L    Potassium 3.9 3.5 - 5.5 mmol/L    Chloride 108 100 - 111 mmol/L    CO2 27 21 - 32 mmol/L    Anion gap 6 3.0 - 18 mmol/L    Glucose 80 74 - 99 mg/dL    BUN 16 7.0 - 18 MG/DL    Creatinine 0.71 0.6 - 1.3 MG/DL    BUN/Creatinine ratio 23 (H) 12 - 20      GFR est AA >60 >60 ml/min/1.73m2    GFR est non-AA >60 >60 ml/min/1.73m2    Calcium 9.2 8.5 - 10.1 MG/DL   HEMOGLOBIN A1C WITH EAG    Collection Time: 01/01/21  1:39 AM   Result Value Ref Range    Hemoglobin A1c 5.5 4.2 - 5.6 %    Est. average glucose 111 mg/dL   GLUCOSE, POC    Collection Time: 01/01/21  6:37 AM   Result Value Ref Range    Glucose (POC) 89 70 - 110 mg/dL         Radiology studies:   Head CT negative for acute intracranial pathology. CTA -official reading pending. To me looks like there is a moderate right ICA stenosis. MRI brain official reading pending. No evidence of acute stroke. There is a probably right PICA calcified aneurysm based on the fact that near the right vertebral artery/PICA region there is a flow void area with calcifications. 65 minutes were spent on the patient of which more than 50% was spent in coordination of care and counseling (time spent with patient/family face to face, physical exam, reviewing laboratory and imaging investigations, speaking with physicians and nursing staff involved in this patient's care)    Assessment:     Mr Alecia Coleman is a 80-year-old man, , who is seen excellent overall shape, evaluated for transient right-sided visual field cut and aphasia. His symptoms localizes to left hemisphere. His symptoms completely resolve MRI was negative for acute stroke. I suspect that he had a TIA which could be secondary to hypoperfusion. I believe that the right ICA stenosis is incidental finding as well as flow void with calcification which to me looks like a calcified aneurysm of the right PICA. He would benefit from now on to be on aspirin chewable 81 mg, take 2 pills every day. Continue simvastatin. In my opinion the patient can be discharged home with return precautions.       Plan:     -Official reading of brain MRI and CTA pending, will review  -Aspirin chewable 81 mg, take 2 tab p.o. every day  - plavix 75 mg - continue DATP x 3 months, after that  mg   -Continue simvastatin  -Monitor blood pressure with the symptoms I suspect he has episodes of hypotension which can explain his symptoms but rather he is on alpha-blocker for BPH  -In my opinion he can be discharged home with return precautions          Kailee Saini MD  Adult Neurologist  1/1/2021

## 2021-01-03 LAB
BACTERIA SPEC CULT: ABNORMAL
CC UR VC: ABNORMAL
SERVICE CMNT-IMP: ABNORMAL

## 2021-01-08 NOTE — ADT AUTH CERT NOTES
PREVIOUSLY DENIED FOR INPATIENT DOWNGRADED TO OBSERVATION REF # [id number]       PLEASE FAX FORM  OR CALL BACK TO NOTIFY IF  AUTHORIZATION FOR OBSERVATION IS OR IS NOT REQUIRED  PHONE # [phone]  FAX # [phone]

## 2022-06-14 ENCOUNTER — HOSPITAL ENCOUNTER (OUTPATIENT)
Dept: ULTRASOUND IMAGING | Age: 87
Discharge: HOME | End: 2022-06-14
Attending: PHYSICIAN ASSISTANT
Payer: MEDICARE

## 2022-06-14 DIAGNOSIS — R33.9 INCOMPLETE BLADDER EMPTYING: ICD-10-CM

## 2022-06-14 PROCEDURE — 76770 US EXAM ABDO BACK WALL COMP: CPT

## 2022-09-19 NOTE — PROGRESS NOTES
[facility]    Patient: Akil Smith Age: 80 y.o. : 1926 MR#: [id number] SSN: [id number]  Date/Time: 2021     C/C: Visual disturbance/CVA      Subjective:   HPI : Patient with history of hyperlipidemia history of TIA admitted due to sudden loss of vision or disturbance suspect extra patient is admitted for further management and work-up neurology has been consulted. Review of Systems:  positive responses in bold type   Constitutional: Negative for fever, chills, diaphoresis and unexpected weight change. HENT: Negative for ear pain, congestion, sore throat, rhinorrhea, drooling, trouble swallowing, neck pain and tinnitus. Eyes: Negative for photophobia, pain, redness and visual disturbance. Respiratory: negative for shortness of breath, cough, choking, chest tightness, wheezing or stridor. Cardiovascular: Negative for chest pain, palpitations and leg swelling. Gastrointestinal: Negative for nausea, vomiting, abdominal pain, diarrhea, constipation, blood in stool, abdominal distention and anal bleeding. Genitourinary: Negative for dysuria, urgency, frequency, hematuria, flank pain and difficulty urinating. Musculoskeletal: Negative for back pain and arthralgias. Skin: Negative for color change, rash and wound. Neurological: Negative for dizziness, seizures, syncope, speech difficulty, light-headedness or headaches. Hematological: Does not bruise/bleed easily. Psychiatric/Behavioral: Negative for suicidal ideas, hallucinations, behavioral problems, self-injury or agitation     Assessment/Plan:     1. 1. TIA r/o CVA  - dysarthria and vision change resolved prior to ED presentation  2. UTI-present on admission  3. Mild hyponatremia  4. Hyperlipidemia  5. BPH  6.  Hx sick sinus syndrome (several years ago, resolved    Plan  Continue aspirin statin Plavix  Continue Rocephin follow urine culture  Neurology follow-up  PT Left message to call back.   OT        Time spent on direct patient care >30 mints     Complexity : High complex - due to multiple medical issues outlined above. CODE Status : DNR    Case discussed with:  [x]Patient  [] Family  []Nursing  []Case Management   DVT Prophylaxis:  []Lovenox  [x]Hep SQ  []SCDs  []Coumadin   []On Heparin gtt      Objective:   VS:   Visit Vitals  BP (!) 156/72 (BP 1 Location: Left arm, BP Patient Position: At rest)   Pulse (!) 59   Temp 97.6 °F (36.4 °C)   Resp 18   Ht 5' 8\" (1.727 m)   Wt 70.3 kg (155 lb)   SpO2 98%   BMI 23.57 kg/m²      Tmax/24hrs: Temp (24hrs), Av.6 °F (36.4 °C), Min:97.4 °F (36.3 °C), Max:98.1 °F (36.7 °C)  IOBRIEF    Intake/Output Summary (Last 24 hours) at 2021 1043  Last data filed at 2021 1014  Gross per 24 hour   Intake 120 ml   Output --   Net 120 ml       General:  Alert, cooperative, no acute distress   HEENT: No facial asymmetry, THIEN Alessandro, External ears - WNL    Cardiovascular: S1S2 - regular , No Murmur   Pulmonary: Equal expansion , No Use of accessory muscles , No Rales No Rhonchi    GI:  +BS in all four quadrants, soft, non-tender  Extremities:  No edema; 2+ dorsalis pedis pulses bilaterally  Neuro: Alert and oriented X 2.        Medications:   Current Facility-Administered Medications   Medication Dose Route Frequency    clopidogreL (PLAVIX) tablet 75 mg  75 mg Oral DAILY    atorvastatin (LIPITOR) tablet 80 mg  80 mg Oral QHS    labetaloL (NORMODYNE;TRANDATE) 20 mg/4 mL (5 mg/mL) injection 5 mg  5 mg IntraVENous Q10MIN PRN    heparin (porcine) injection 5,000 Units  5,000 Units SubCUTAneous Q8H    aspirin delayed-release tablet 81 mg  81 mg Oral DAILY    sodium chloride (NS) flush 5-40 mL  5-40 mL IntraVENous Q8H    sodium chloride (NS) flush 5-40 mL  5-40 mL IntraVENous PRN    acetaminophen (TYLENOL) tablet 650 mg  650 mg Oral Q6H PRN    Or    acetaminophen (TYLENOL) suppository 650 mg  650 mg Rectal Q6H PRN    polyethylene glycol (MIRALAX) packet 17 g  17 g Oral DAILY PRN    promethazine (PHENERGAN) tablet 12.5 mg  12.5 mg Oral Q6H PRN    Or    ondansetron (ZOFRAN) injection 4 mg  4 mg IntraVENous Q6H PRN    famotidine (PEPCID) tablet 20 mg  20 mg Oral DAILY    cefTRIAXone (ROCEPHIN) 1 g in sterile water (preservative free) 10 mL IV syringe  1 g IntraVENous Q24H    cholecalciferol (VITAMIN D3) (1000 Units /25 mcg) tablet 2 Tab  2,000 Units Oral DAILY    tamsulosin (FLOMAX) capsule 0.4 mg  0.4 mg Oral DAILY       Labs:    Recent Labs     01/01/21  0139 12/31/20  1245   WBC 6.0 5.9   HGB 12.4* 13.4   HCT 37.0 41.0    163     Recent Labs     01/01/21 0139 12/31/20  1245    135*   K 3.9 4.4    103   CO2 27 29   GLU 80 112*   BUN 16 20*   CREA 0.71 0.91   CA 9.2 8.6   ALB 3.1*  --    ALT 17  --    INR  --  1.0         Disclaimer: Sections of this note are dictated utilizing voice recognition software, which may have resulted in some phonetic based errors in grammar and contents. Even though attempts were made to correct all the mistakes, some may have been missed, and remained in the body of the document. If questions arise, please contact our department.     Signed By: Jalil Simeon MD     January 1, 2021

## (undated) DEVICE — BAG DRAINAGE CUST DISP

## (undated) DEVICE — LUB SURG MEDC STRL 2OZ TUBE MC -- MEDICHOICE

## (undated) DEVICE — SOLUTION IRRIG 3000ML 0.9% SOD CHL FLX CONT 0797208] ICU MEDICAL INC]

## (undated) DEVICE — Device: Brand: OLYMPUS

## (undated) DEVICE — STER SINGLE BASIN SET W/BOWLS: Brand: CARDINAL HEALTH

## (undated) DEVICE — FLEX ADVANTAGE 3000CC: Brand: FLEX ADVANTAGE

## (undated) DEVICE — CATH URETH FOL 3W MED 22FRX30 --

## (undated) DEVICE — 3M™ BAIR PAWS FLEX™ WARMING GOWN, STANDARD, 20 PER CASE 81003: Brand: BAIR PAWS™

## (undated) DEVICE — SOLUTION IV 1000ML 0.9% SOD CHL

## (undated) DEVICE — (D)SYR 10ML 1/5ML GRAD NSAF -- PKGING CHANGE USE ITEM 338027

## (undated) DEVICE — STANDARD HYPODERMIC NEEDLE,ALUMINUM HUB: Brand: MONOJECT

## (undated) DEVICE — TABLE COVER: Brand: CONVERTORS

## (undated) DEVICE — GAUZE SPONGES,16 PLY: Brand: CURITY

## (undated) DEVICE — BIOPTY-CUT® DISPOSABLE CORE BIOPSY NEEDLE, 18G X 20CM: Brand: BIOPTY-CUT

## (undated) DEVICE — TUBING IRRIG L77IN DIA0.241IN L BOR FOR CYSTO W/ NVENT

## (undated) DEVICE — EVACUATOR URO BLDR W/ ADPT UROVAC

## (undated) DEVICE — Z DUP USE 2565107 PACK SURG PROC LEG CYSTO T-DRAPE REINF TBL CVR HND TWL

## (undated) DEVICE — GOWN,REINFORCED,POLY,AURORA,XXLARGE,STR: Brand: MEDLINE

## (undated) DEVICE — CATHETER URETH 16FR BLLN 5CC SIL ALLY W/ SIL HYDRGEL 2 W F

## (undated) DEVICE — Z DISCONTINUED BY MEDLINE USE 2711682 TRAY SKIN PREP DRY W/ PREM GLV

## (undated) DEVICE — KIT CLN UP BON SECOURS MARYV

## (undated) DEVICE — DRAPE,UNDERBUTTOCKS,PCH,STERILE: Brand: MEDLINE

## (undated) DEVICE — BAG DRAIN URIN 2000ML LF STRL -- CONVERT TO ITEM 363123

## (undated) DEVICE — GOWN,PREVENTION PLUS,XLN/XL,ST,24/CS: Brand: MEDLINE